# Patient Record
Sex: FEMALE | Race: WHITE | Employment: OTHER | URBAN - METROPOLITAN AREA
[De-identification: names, ages, dates, MRNs, and addresses within clinical notes are randomized per-mention and may not be internally consistent; named-entity substitution may affect disease eponyms.]

---

## 2017-03-02 ENCOUNTER — HOSPITAL ENCOUNTER (OUTPATIENT)
Dept: MAMMOGRAPHY | Age: 62
Discharge: HOME | End: 2017-03-02

## 2017-03-02 DIAGNOSIS — Z12.31 ENCOUNTER FOR SCREENING MAMMOGRAM FOR BREAST CANCER: ICD-10-CM

## 2017-06-22 ENCOUNTER — TELEPHONE (OUTPATIENT)
Dept: ENDOCRINOLOGY | Age: 62
End: 2017-06-22

## 2017-06-29 ENCOUNTER — NURSE ONLY (OUTPATIENT)
Age: 62
End: 2017-06-29

## 2017-06-29 DIAGNOSIS — M81.0 OSTEOPOROSIS, POSTMENOPAUSAL: Primary | ICD-10-CM

## 2017-06-29 RX ORDER — ACETAMINOPHEN 160 MG
TABLET,DISINTEGRATING ORAL
COMMUNITY
End: 2019-12-27

## 2018-01-17 PROBLEM — E55.9 VITAMIN D DEFICIENCY: Status: ACTIVE | Noted: 2018-01-17

## 2018-01-30 ENCOUNTER — PROCEDURE VISIT (OUTPATIENT)
Age: 63
End: 2018-01-30

## 2018-01-30 ENCOUNTER — HOSPITAL ENCOUNTER (OUTPATIENT)
Dept: GENERAL RADIOLOGY | Age: 63
Discharge: HOME | End: 2018-01-30
Attending: INTERNAL MEDICINE | Admitting: INTERNAL MEDICINE

## 2018-01-30 ENCOUNTER — OFFICE VISIT (OUTPATIENT)
Age: 63
End: 2018-01-30

## 2018-01-30 VITALS
BODY MASS INDEX: 23.07 KG/M2 | HEIGHT: 63 IN | DIASTOLIC BLOOD PRESSURE: 78 MMHG | SYSTOLIC BLOOD PRESSURE: 134 MMHG | WEIGHT: 130.2 LBS

## 2018-01-30 DIAGNOSIS — M81.0 OSTEOPOROSIS, POSTMENOPAUSAL: Primary | ICD-10-CM

## 2018-01-30 DIAGNOSIS — M81.0 OSTEOPOROSIS, POSTMENOPAUSAL: ICD-10-CM

## 2018-01-30 DIAGNOSIS — E55.9 VITAMIN D DEFICIENCY: ICD-10-CM

## 2018-01-30 DIAGNOSIS — Z51.81 MEDICATION MONITORING ENCOUNTER: ICD-10-CM

## 2018-01-30 LAB
ALBUMIN SERPL-MCNC: 4.7 G/DL (ref 3.4–5)
ANION GAP SERPL CALCULATED.3IONS-SCNC: 13 MMOL/L (ref 3–16)
BUN BLDV-MCNC: 16 MG/DL (ref 7–20)
CALCIUM SERPL-MCNC: 9.5 MG/DL (ref 8.3–10.6)
CHLORIDE BLD-SCNC: 101 MMOL/L (ref 99–110)
CO2: 30 MMOL/L (ref 21–32)
CREAT SERPL-MCNC: 0.7 MG/DL (ref 0.6–1.2)
GFR AFRICAN AMERICAN: >60
GFR NON-AFRICAN AMERICAN: >60
GLUCOSE BLD-MCNC: 95 MG/DL (ref 70–99)
PHOSPHORUS: 4.5 MG/DL (ref 2.5–4.9)
POTASSIUM SERPL-SCNC: 4.3 MMOL/L (ref 3.5–5.1)
SODIUM BLD-SCNC: 144 MMOL/L (ref 136–145)

## 2018-01-30 PROCEDURE — 1036F TOBACCO NON-USER: CPT | Performed by: INTERNAL MEDICINE

## 2018-01-30 PROCEDURE — 96372 THER/PROPH/DIAG INJ SC/IM: CPT | Performed by: INTERNAL MEDICINE

## 2018-01-30 PROCEDURE — 3014F SCREEN MAMMO DOC REV: CPT | Performed by: INTERNAL MEDICINE

## 2018-01-30 PROCEDURE — 3017F COLORECTAL CA SCREEN DOC REV: CPT | Performed by: INTERNAL MEDICINE

## 2018-01-30 PROCEDURE — 99214 OFFICE O/P EST MOD 30 MIN: CPT | Performed by: INTERNAL MEDICINE

## 2018-01-30 PROCEDURE — G8484 FLU IMMUNIZE NO ADMIN: HCPCS | Performed by: INTERNAL MEDICINE

## 2018-01-30 PROCEDURE — 77080 DXA BONE DENSITY AXIAL: CPT | Performed by: INTERNAL MEDICINE

## 2018-01-30 PROCEDURE — G8420 CALC BMI NORM PARAMETERS: HCPCS | Performed by: INTERNAL MEDICINE

## 2018-01-30 PROCEDURE — G8427 DOCREV CUR MEDS BY ELIG CLIN: HCPCS | Performed by: INTERNAL MEDICINE

## 2018-01-30 NOTE — PROGRESS NOTES
[facility], [place] ([facility]) Osteoporosis and [address] [facility] M[address]a [address], [address] [place] [address]  Phone [phone]  Fax [phone]    NAME: Pamela Rueda   :  1955  CONSULT DATE: 10/08/2012  MOST RECENT VISIT: 2016  TODAYS DATE: 2018          Labs @ [facility] 2016    PROBLEMS. Osteoporosis by DXA 2008, lowest T-score -4.6, Z-score -2.6 in the spine (L3-L4)    Family history of osteoporosis, mother  Vitamin D deficiency, desirable 25-OH D is 30-60 ng/mL    27 ng/mL 2016  Natural menopause age 54 ()    CURRENT MANAGEMENT FOR OSTEOPOROSIS. Calcium, diet 1800 mg/d + OsCal-500 BID    900 mg/d milk, 300 mg/d yogurt, 300 mg/d cheese, 300 mg/d other   Vitamin D, with calcium 400 IU/d + supplement 2000 IU/d added 2016  Exercise, walks one mile daily, exercise bike  Pharmacologic therapy, Prolia 60 mg SQ twice yearly started 2014    PREVIOUS MEDICATIONS FOR OSTEOPOROSIS. Actonel [id number], changed by MD John Paul Case -2012, stopped due to low BMD, again 10/2012-2014, changed to [address] [place] [address],[address]. None  OTC MEDICATIONS. Ibuprofen    CHIEF COMPLAINT. Here for followup of osteoporosis and monitoring treatment. No new related signs or symptoms. INTERVAL HISTORY. See problem list for chronic/inactive conditions. She received Prolia without side effects. No falls, near-falls or fractures. She feels well overall. FOR FULL DETAILS OF FAMILY HISTORY, PAST MEDICAL AND SURGICAL HISTORY, SOCIAL HISTORY, AND REVIEW OF SYSTEMS, SEE PATIENT QUESTIONNAIRE OF TODAYS DATE. PHYSICAL EXAMINATION. GENERAL. Well-nourished, well-developed, normally proportioned adult. APPEARANCE. Healthy. MENTAL STATUS. Cheerful and alert. Oriented to time, place, and person. MUSCULOSKELETAL. Spinal contours are normal.  No spine tenderness to palpation or percussion. Two finger spaces between ribs and pelvis. No joint deformity. No edema.  Gait steady without

## 2018-01-31 LAB — VITAMIN D 25-HYDROXY: 47 NG/ML

## 2018-02-07 ENCOUNTER — TELEPHONE (OUTPATIENT)
Dept: ENDOCRINOLOGY | Age: 63
End: 2018-02-07

## 2018-02-07 NOTE — TELEPHONE ENCOUNTER
Pt stopped in to see if she can get a letter of recommendation for her to be in the [employer] program. She is technically not old enough due to not being on medicare, however, her  was able to get a recommendation with a letter from his doctor, so she thought she would try the same thing. She can get a discount by joining this program, [employer] program. Please give her a call to let her know if it's possible to get a letter.

## 2018-02-08 NOTE — TELEPHONE ENCOUNTER
Patient is requesting a letter of recommendation for her to be able to join [employer] program, since she is not on Medicare.   Please advise

## 2018-03-08 ENCOUNTER — HOSPITAL ENCOUNTER (OUTPATIENT)
Dept: MAMMOGRAPHY | Age: 63
Discharge: HOME | End: 2018-03-08
Attending: OBSTETRICS & GYNECOLOGY | Admitting: OBSTETRICS & GYNECOLOGY

## 2018-03-08 DIAGNOSIS — Z12.39 BREAST CANCER SCREENING: ICD-10-CM

## 2018-07-31 ENCOUNTER — TELEPHONE (OUTPATIENT)
Age: 63
End: 2018-07-31

## 2018-08-01 DIAGNOSIS — M81.0 OSTEOPOROSIS, POSTMENOPAUSAL: Primary | ICD-10-CM

## 2018-08-02 ENCOUNTER — NURSE ONLY (OUTPATIENT)
Age: 63
End: 2018-08-02

## 2018-08-02 DIAGNOSIS — M81.0 OSTEOPOROSIS, POSTMENOPAUSAL: Primary | ICD-10-CM

## 2018-08-02 PROCEDURE — 96372 THER/PROPH/DIAG INJ SC/IM: CPT | Performed by: INTERNAL MEDICINE

## 2018-08-02 PROCEDURE — 99999 PR OFFICE/OUTPT VISIT,PROCEDURE ONLY: CPT | Performed by: INTERNAL MEDICINE

## 2018-08-02 NOTE — PROGRESS NOTES
Informed patient if any signs of redness,rash,swelling or unusual symptoms occur, please contact the office. Prolia given per physician order. Patient supplied the Prolia. It is the patient's responsibility to notify the physician office of new insurance plan prior to appointment to prevent delay in treatment. Please send a copy of the front and back of the insurance card either by fax, mail or stop by the office.

## 2018-12-17 ENCOUNTER — TELEPHONE (OUTPATIENT)
Dept: ENDOCRINOLOGY | Age: 63
End: 2018-12-17

## 2019-01-29 ENCOUNTER — TELEPHONE (OUTPATIENT)
Dept: ENDOCRINOLOGY | Age: 64
End: 2019-01-29

## 2019-02-05 DIAGNOSIS — M81.0 OSTEOPOROSIS, POSTMENOPAUSAL: Primary | ICD-10-CM

## 2019-02-18 ENCOUNTER — TELEPHONE (OUTPATIENT)
Dept: ENDOCRINOLOGY | Age: 64
End: 2019-02-18

## 2019-02-21 ENCOUNTER — TELEPHONE (OUTPATIENT)
Dept: ENDOCRINOLOGY | Age: 64
End: 2019-02-21

## 2019-02-25 ENCOUNTER — OFFICE VISIT (OUTPATIENT)
Dept: ENDOCRINOLOGY | Age: 64
End: 2019-02-25
Payer: COMMERCIAL

## 2019-02-25 ENCOUNTER — PROCEDURE VISIT (OUTPATIENT)
Dept: ENDOCRINOLOGY | Age: 64
End: 2019-02-25
Payer: COMMERCIAL

## 2019-02-25 ENCOUNTER — HOSPITAL ENCOUNTER (OUTPATIENT)
Dept: GENERAL RADIOLOGY | Age: 64
Discharge: HOME | End: 2019-02-25
Payer: COMMERCIAL

## 2019-02-25 VITALS
BODY MASS INDEX: 22.96 KG/M2 | HEIGHT: 63 IN | WEIGHT: 129.6 LBS | SYSTOLIC BLOOD PRESSURE: 131 MMHG | DIASTOLIC BLOOD PRESSURE: 78 MMHG

## 2019-02-25 DIAGNOSIS — M81.0 OSTEOPOROSIS, POSTMENOPAUSAL: ICD-10-CM

## 2019-02-25 DIAGNOSIS — E55.9 VITAMIN D DEFICIENCY: ICD-10-CM

## 2019-02-25 DIAGNOSIS — Z51.81 MEDICATION MONITORING ENCOUNTER: ICD-10-CM

## 2019-02-25 PROCEDURE — 77080 DXA BONE DENSITY AXIAL: CPT

## 2019-02-25 PROCEDURE — 99214 OFFICE O/P EST MOD 30 MIN: CPT | Performed by: INTERNAL MEDICINE

## 2019-02-25 PROCEDURE — 96372 THER/PROPH/DIAG INJ SC/IM: CPT | Performed by: INTERNAL MEDICINE

## 2019-02-25 PROCEDURE — 77080 DXA BONE DENSITY AXIAL: CPT | Performed by: INTERNAL MEDICINE

## 2019-02-25 RX ORDER — FLUOROURACIL 50 MG/G
CREAM TOPICAL 2 TIMES DAILY
COMMUNITY

## 2019-03-11 ENCOUNTER — HOSPITAL ENCOUNTER (OUTPATIENT)
Dept: MAMMOGRAPHY | Age: 64
Discharge: HOME | End: 2019-03-11
Payer: OTHER GOVERNMENT

## 2019-03-11 DIAGNOSIS — Z12.31 ENCOUNTER FOR SCREENING MAMMOGRAM FOR BREAST CANCER: ICD-10-CM

## 2019-03-11 PROCEDURE — 77067 SCR MAMMO BI INCL CAD: CPT

## 2019-08-29 ENCOUNTER — NURSE ONLY (OUTPATIENT)
Dept: ENDOCRINOLOGY | Age: 64
End: 2019-08-29
Payer: COMMERCIAL

## 2019-08-29 DIAGNOSIS — M81.0 OSTEOPOROSIS, POSTMENOPAUSAL: ICD-10-CM

## 2019-08-29 PROCEDURE — 96372 THER/PROPH/DIAG INJ SC/IM: CPT | Performed by: INTERNAL MEDICINE

## 2019-10-04 LAB
HPV COMMENT: NORMAL
HPV TYPE 16: NOT DETECTED
HPV TYPE 18: NOT DETECTED
HPVOH (OTHER TYPES): NOT DETECTED

## 2020-01-02 ENCOUNTER — HOSPITAL ENCOUNTER (OUTPATIENT)
Dept: GENERAL RADIOLOGY | Age: 65
Discharge: HOME | End: 2020-01-02
Payer: OTHER GOVERNMENT

## 2020-01-02 ENCOUNTER — HOSPITAL ENCOUNTER (OUTPATIENT)
Age: 65
Discharge: HOME | End: 2020-01-02
Payer: OTHER GOVERNMENT

## 2020-01-02 LAB
A/G RATIO: 1.4 (ref 1.1–2.2)
ABO/RH: NORMAL
ALBUMIN SERPL-MCNC: 4.3 G/DL (ref 3.4–5)
ALP BLD-CCNC: 66 U/L (ref 40–129)
ALT SERPL-CCNC: 18 U/L (ref 10–40)
ANION GAP SERPL CALCULATED.3IONS-SCNC: 13 MMOL/L (ref 3–16)
ANTIBODY SCREEN: NORMAL
AST SERPL-CCNC: 18 U/L (ref 15–37)
BASOPHILS ABSOLUTE: 0.1 K/UL (ref 0–0.2)
BASOPHILS RELATIVE PERCENT: 1 %
BILIRUB SERPL-MCNC: 0.6 MG/DL (ref 0–1)
BILIRUBIN URINE: NEGATIVE
BLOOD, URINE: NEGATIVE
BUN BLDV-MCNC: 13 MG/DL (ref 7–20)
CALCIUM SERPL-MCNC: 9 MG/DL (ref 8.3–10.6)
CHLORIDE BLD-SCNC: 101 MMOL/L (ref 99–110)
CLARITY: ABNORMAL
CO2: 26 MMOL/L (ref 21–32)
COLOR: YELLOW
CREAT SERPL-MCNC: 0.7 MG/DL (ref 0.6–1.2)
EKG ATRIAL RATE: 79 BPM
EKG DIAGNOSIS: NORMAL
EKG P AXIS: 67 DEGREES
EKG P-R INTERVAL: 128 MS
EKG Q-T INTERVAL: 370 MS
EKG QRS DURATION: 76 MS
EKG QTC CALCULATION (BAZETT): 424 MS
EKG R AXIS: 82 DEGREES
EKG T AXIS: 9 DEGREES
EKG VENTRICULAR RATE: 79 BPM
EOSINOPHILS ABSOLUTE: 0.2 K/UL (ref 0–0.6)
EOSINOPHILS RELATIVE PERCENT: 2.7 %
EPITHELIAL CELLS, UA: 9 /HPF (ref 0–5)
GFR AFRICAN AMERICAN: >60
GFR NON-AFRICAN AMERICAN: >60
GLOBULIN: 3 G/DL
GLUCOSE BLD-MCNC: 89 MG/DL (ref 70–99)
GLUCOSE URINE: NEGATIVE MG/DL
HCT VFR BLD CALC: 43.3 % (ref 36–48)
HEMOGLOBIN: 14.4 G/DL (ref 12–16)
HYALINE CASTS: 4 /LPF (ref 0–8)
KETONES, URINE: NEGATIVE MG/DL
LEUKOCYTE ESTERASE, URINE: ABNORMAL
LYMPHOCYTES ABSOLUTE: 1 K/UL (ref 1–5.1)
LYMPHOCYTES RELATIVE PERCENT: 11.6 %
MCH RBC QN AUTO: 31 PG (ref 26–34)
MCHC RBC AUTO-ENTMCNC: 33.3 G/DL (ref 31–36)
MCV RBC AUTO: 93.2 FL (ref 80–100)
MICROSCOPIC EXAMINATION: YES
MONOCYTES ABSOLUTE: 0.9 K/UL (ref 0–1.3)
MONOCYTES RELATIVE PERCENT: 10.4 %
NEUTROPHILS ABSOLUTE: 6.4 K/UL (ref 1.7–7.7)
NEUTROPHILS RELATIVE PERCENT: 74.3 %
NITRITE, URINE: NEGATIVE
PDW BLD-RTO: 14.5 % (ref 12.4–15.4)
PH UA: 6 (ref 5–8)
PLATELET # BLD: 480 K/UL (ref 135–450)
PMV BLD AUTO: 8 FL (ref 5–10.5)
POTASSIUM SERPL-SCNC: 3.8 MMOL/L (ref 3.5–5.1)
PROTEIN UA: NEGATIVE MG/DL
RBC # BLD: 4.65 M/UL (ref 4–5.2)
RBC UA: 2 /HPF (ref 0–4)
SODIUM BLD-SCNC: 140 MMOL/L (ref 136–145)
SPECIFIC GRAVITY UA: 1.02 (ref 1–1.03)
TOTAL PROTEIN: 7.3 G/DL (ref 6.4–8.2)
URINE TYPE: ABNORMAL
UROBILINOGEN, URINE: 0.2 E.U./DL
WBC # BLD: 8.7 K/UL (ref 4–11)
WBC UA: 2 /HPF (ref 0–5)

## 2020-01-02 PROCEDURE — 80053 COMPREHEN METABOLIC PANEL: CPT

## 2020-01-02 PROCEDURE — 93005 ELECTROCARDIOGRAM TRACING: CPT | Performed by: OBSTETRICS & GYNECOLOGY

## 2020-01-02 PROCEDURE — 71046 X-RAY EXAM CHEST 2 VIEWS: CPT

## 2020-01-02 PROCEDURE — 86900 BLOOD TYPING SEROLOGIC ABO: CPT

## 2020-01-02 PROCEDURE — 81001 URINALYSIS AUTO W/SCOPE: CPT

## 2020-01-02 PROCEDURE — 85025 COMPLETE CBC W/AUTO DIFF WBC: CPT

## 2020-01-02 PROCEDURE — 93010 ELECTROCARDIOGRAM REPORT: CPT | Performed by: INTERNAL MEDICINE

## 2020-01-02 PROCEDURE — 86901 BLOOD TYPING SEROLOGIC RH(D): CPT

## 2020-01-02 PROCEDURE — 86850 RBC ANTIBODY SCREEN: CPT

## 2020-01-02 PROCEDURE — 36415 COLL VENOUS BLD VENIPUNCTURE: CPT

## 2020-01-16 ENCOUNTER — HOSPITAL ENCOUNTER (OUTPATIENT)
Age: 65
Setting detail: OBSERVATION
Discharge: HOME | End: 2020-01-17
Attending: OBSTETRICS & GYNECOLOGY | Admitting: OBSTETRICS & GYNECOLOGY
Payer: OTHER GOVERNMENT

## 2020-01-16 ENCOUNTER — ANESTHESIA EVENT (OUTPATIENT)
Dept: OPERATING ROOM | Age: 65
End: 2020-01-16
Payer: OTHER GOVERNMENT

## 2020-01-16 ENCOUNTER — ANESTHESIA (OUTPATIENT)
Dept: OPERATING ROOM | Age: 65
End: 2020-01-16
Payer: OTHER GOVERNMENT

## 2020-01-16 VITALS
OXYGEN SATURATION: 100 % | SYSTOLIC BLOOD PRESSURE: 91 MMHG | TEMPERATURE: 96.1 F | DIASTOLIC BLOOD PRESSURE: 57 MMHG | RESPIRATION RATE: 13 BRPM

## 2020-01-16 PROBLEM — N81.3 UTEROVAGINAL PROLAPSE, COMPLETE: Status: ACTIVE | Noted: 2020-01-16

## 2020-01-16 LAB
ABO/RH: NORMAL
ANTIBODY SCREEN: NORMAL

## 2020-01-16 PROCEDURE — 2720000010 HC SURG SUPPLY STERILE: Performed by: OBSTETRICS & GYNECOLOGY

## 2020-01-16 PROCEDURE — 6360000002 HC RX W HCPCS: Performed by: OBSTETRICS & GYNECOLOGY

## 2020-01-16 PROCEDURE — 86850 RBC ANTIBODY SCREEN: CPT

## 2020-01-16 PROCEDURE — 6360000002 HC RX W HCPCS: Performed by: NURSE ANESTHETIST, CERTIFIED REGISTERED

## 2020-01-16 PROCEDURE — 36415 COLL VENOUS BLD VENIPUNCTURE: CPT

## 2020-01-16 PROCEDURE — 94150 VITAL CAPACITY TEST: CPT

## 2020-01-16 PROCEDURE — 88305 TISSUE EXAM BY PATHOLOGIST: CPT

## 2020-01-16 PROCEDURE — 2580000003 HC RX 258: Performed by: OBSTETRICS & GYNECOLOGY

## 2020-01-16 PROCEDURE — S2900 ROBOTIC SURGICAL SYSTEM: HCPCS | Performed by: OBSTETRICS & GYNECOLOGY

## 2020-01-16 PROCEDURE — 3700000001 HC ADD 15 MINUTES (ANESTHESIA): Performed by: OBSTETRICS & GYNECOLOGY

## 2020-01-16 PROCEDURE — 2500000003 HC RX 250 WO HCPCS: Performed by: NURSE ANESTHETIST, CERTIFIED REGISTERED

## 2020-01-16 PROCEDURE — 86900 BLOOD TYPING SEROLOGIC ABO: CPT

## 2020-01-16 PROCEDURE — 3600000019 HC SURGERY ROBOT ADDTL 15MIN: Performed by: OBSTETRICS & GYNECOLOGY

## 2020-01-16 PROCEDURE — 6370000000 HC RX 637 (ALT 250 FOR IP): Performed by: FAMILY MEDICINE

## 2020-01-16 PROCEDURE — 2500000003 HC RX 250 WO HCPCS: Performed by: OBSTETRICS & GYNECOLOGY

## 2020-01-16 PROCEDURE — G0378 HOSPITAL OBSERVATION PER HR: HCPCS

## 2020-01-16 PROCEDURE — 6370000000 HC RX 637 (ALT 250 FOR IP): Performed by: OBSTETRICS & GYNECOLOGY

## 2020-01-16 PROCEDURE — 3700000000 HC ANESTHESIA ATTENDED CARE: Performed by: OBSTETRICS & GYNECOLOGY

## 2020-01-16 PROCEDURE — C1781 MESH (IMPLANTABLE): HCPCS | Performed by: OBSTETRICS & GYNECOLOGY

## 2020-01-16 PROCEDURE — C1771 REP DEV, URINARY, W/SLING: HCPCS | Performed by: OBSTETRICS & GYNECOLOGY

## 2020-01-16 PROCEDURE — 2580000003 HC RX 258: Performed by: NURSE ANESTHETIST, CERTIFIED REGISTERED

## 2020-01-16 PROCEDURE — 2709999900 HC NON-CHARGEABLE SUPPLY: Performed by: OBSTETRICS & GYNECOLOGY

## 2020-01-16 PROCEDURE — 6360000002 HC RX W HCPCS: Performed by: FAMILY MEDICINE

## 2020-01-16 PROCEDURE — 86901 BLOOD TYPING SEROLOGIC RH(D): CPT

## 2020-01-16 PROCEDURE — 7100000001 HC PACU RECOVERY - ADDTL 15 MIN: Performed by: OBSTETRICS & GYNECOLOGY

## 2020-01-16 PROCEDURE — 3600000009 HC SURGERY ROBOT BASE: Performed by: OBSTETRICS & GYNECOLOGY

## 2020-01-16 PROCEDURE — 7100000000 HC PACU RECOVERY - FIRST 15 MIN: Performed by: OBSTETRICS & GYNECOLOGY

## 2020-01-16 PROCEDURE — 94761 N-INVAS EAR/PLS OXIMETRY MLT: CPT

## 2020-01-16 DEVICE — TRANSOBTURATOR MID-URETHRAL SYSTEM
Type: IMPLANTABLE DEVICE | Site: BLADDER | Status: FUNCTIONAL
Brand: OBTRYX™ SYSTEM - HALO

## 2020-01-16 DEVICE — MESH SYN Y SHP FLAT SUT SURF MULTDIR POS FOR VAG COLPASSIST: Type: IMPLANTABLE DEVICE | Site: BLADDER | Status: FUNCTIONAL

## 2020-01-16 RX ORDER — SODIUM CHLORIDE 9 MG/ML
INJECTION, SOLUTION INTRAVENOUS CONTINUOUS PRN
Status: DISCONTINUED | OUTPATIENT
Start: 2020-01-16 | End: 2020-01-16 | Stop reason: SDUPTHER

## 2020-01-16 RX ORDER — HYDROCODONE BITARTRATE AND ACETAMINOPHEN 5; 325 MG/1; MG/1
2 TABLET ORAL EVERY 4 HOURS PRN
Status: DISCONTINUED | OUTPATIENT
Start: 2020-01-16 | End: 2020-01-17 | Stop reason: HOSPADM

## 2020-01-16 RX ORDER — APREPITANT 40 MG/1
40 CAPSULE ORAL ONCE
Status: COMPLETED | OUTPATIENT
Start: 2020-01-16 | End: 2020-01-16

## 2020-01-16 RX ORDER — ONDANSETRON 2 MG/ML
4 INJECTION INTRAMUSCULAR; INTRAVENOUS EVERY 8 HOURS PRN
Status: DISCONTINUED | OUTPATIENT
Start: 2020-01-16 | End: 2020-01-17 | Stop reason: HOSPADM

## 2020-01-16 RX ORDER — SODIUM CHLORIDE, SODIUM LACTATE, POTASSIUM CHLORIDE, CALCIUM CHLORIDE 600; 310; 30; 20 MG/100ML; MG/100ML; MG/100ML; MG/100ML
INJECTION, SOLUTION INTRAVENOUS CONTINUOUS PRN
Status: COMPLETED | OUTPATIENT
Start: 2020-01-16 | End: 2020-01-16

## 2020-01-16 RX ORDER — POLYETHYLENE GLYCOL 3350 17 G/17G
17 POWDER, FOR SOLUTION ORAL 2 TIMES DAILY
Status: DISCONTINUED | OUTPATIENT
Start: 2020-01-16 | End: 2020-01-17 | Stop reason: HOSPADM

## 2020-01-16 RX ORDER — LIDOCAINE HYDROCHLORIDE 20 MG/ML
INJECTION, SOLUTION EPIDURAL; INFILTRATION; INTRACAUDAL; PERINEURAL PRN
Status: DISCONTINUED | OUTPATIENT
Start: 2020-01-16 | End: 2020-01-16 | Stop reason: SDUPTHER

## 2020-01-16 RX ORDER — SODIUM CHLORIDE, SODIUM LACTATE, POTASSIUM CHLORIDE, CALCIUM CHLORIDE 600; 310; 30; 20 MG/100ML; MG/100ML; MG/100ML; MG/100ML
INJECTION, SOLUTION INTRAVENOUS CONTINUOUS
Status: DISCONTINUED | OUTPATIENT
Start: 2020-01-16 | End: 2020-01-17 | Stop reason: HOSPADM

## 2020-01-16 RX ORDER — PROMETHAZINE HYDROCHLORIDE 25 MG/ML
6.25 INJECTION, SOLUTION INTRAMUSCULAR; INTRAVENOUS PRN
Status: DISCONTINUED | OUTPATIENT
Start: 2020-01-16 | End: 2020-01-16 | Stop reason: HOSPADM

## 2020-01-16 RX ORDER — BUPIVACAINE HYDROCHLORIDE AND EPINEPHRINE 5; 5 MG/ML; UG/ML
INJECTION, SOLUTION EPIDURAL; INTRACAUDAL; PERINEURAL
Status: COMPLETED | OUTPATIENT
Start: 2020-01-16 | End: 2020-01-16

## 2020-01-16 RX ORDER — HYDROMORPHONE HCL 110MG/55ML
0.25 PATIENT CONTROLLED ANALGESIA SYRINGE INTRAVENOUS EVERY 5 MIN PRN
Status: DISCONTINUED | OUTPATIENT
Start: 2020-01-16 | End: 2020-01-16 | Stop reason: HOSPADM

## 2020-01-16 RX ORDER — MORPHINE SULFATE 4 MG/ML
4 INJECTION, SOLUTION INTRAMUSCULAR; INTRAVENOUS
Status: DISCONTINUED | OUTPATIENT
Start: 2020-01-16 | End: 2020-01-17 | Stop reason: HOSPADM

## 2020-01-16 RX ORDER — LABETALOL HYDROCHLORIDE 5 MG/ML
5 INJECTION, SOLUTION INTRAVENOUS EVERY 10 MIN PRN
Status: DISCONTINUED | OUTPATIENT
Start: 2020-01-16 | End: 2020-01-16 | Stop reason: HOSPADM

## 2020-01-16 RX ORDER — SODIUM CHLORIDE 0.9 % (FLUSH) 0.9 %
10 SYRINGE (ML) INJECTION PRN
Status: DISCONTINUED | OUTPATIENT
Start: 2020-01-16 | End: 2020-01-17 | Stop reason: HOSPADM

## 2020-01-16 RX ORDER — HYDROMORPHONE HCL 110MG/55ML
0.5 PATIENT CONTROLLED ANALGESIA SYRINGE INTRAVENOUS EVERY 5 MIN PRN
Status: DISCONTINUED | OUTPATIENT
Start: 2020-01-16 | End: 2020-01-16 | Stop reason: HOSPADM

## 2020-01-16 RX ORDER — ROCURONIUM BROMIDE 10 MG/ML
INJECTION, SOLUTION INTRAVENOUS PRN
Status: DISCONTINUED | OUTPATIENT
Start: 2020-01-16 | End: 2020-01-16 | Stop reason: SDUPTHER

## 2020-01-16 RX ORDER — GLYCOPYRROLATE 1 MG/5 ML
SYRINGE (ML) INTRAVENOUS PRN
Status: DISCONTINUED | OUTPATIENT
Start: 2020-01-16 | End: 2020-01-16 | Stop reason: SDUPTHER

## 2020-01-16 RX ORDER — ACETAMINOPHEN 325 MG/1
650 TABLET ORAL EVERY 4 HOURS PRN
Status: DISCONTINUED | OUTPATIENT
Start: 2020-01-16 | End: 2020-01-17 | Stop reason: HOSPADM

## 2020-01-16 RX ORDER — KETOROLAC TROMETHAMINE 15 MG/ML
15 INJECTION, SOLUTION INTRAMUSCULAR; INTRAVENOUS EVERY 6 HOURS
Status: DISCONTINUED | OUTPATIENT
Start: 2020-01-16 | End: 2020-01-17 | Stop reason: HOSPADM

## 2020-01-16 RX ORDER — PHENAZOPYRIDINE HYDROCHLORIDE 100 MG/1
200 TABLET, FILM COATED ORAL ONCE
Status: COMPLETED | OUTPATIENT
Start: 2020-01-16 | End: 2020-01-16

## 2020-01-16 RX ORDER — ONDANSETRON 2 MG/ML
INJECTION INTRAMUSCULAR; INTRAVENOUS PRN
Status: DISCONTINUED | OUTPATIENT
Start: 2020-01-16 | End: 2020-01-16 | Stop reason: SDUPTHER

## 2020-01-16 RX ORDER — DEXAMETHASONE SODIUM PHOSPHATE 4 MG/ML
INJECTION, SOLUTION INTRA-ARTICULAR; INTRALESIONAL; INTRAMUSCULAR; INTRAVENOUS; SOFT TISSUE PRN
Status: DISCONTINUED | OUTPATIENT
Start: 2020-01-16 | End: 2020-01-16 | Stop reason: SDUPTHER

## 2020-01-16 RX ORDER — NEOSTIGMINE METHYLSULFATE 5 MG/5 ML
SYRINGE (ML) INTRAVENOUS PRN
Status: DISCONTINUED | OUTPATIENT
Start: 2020-01-16 | End: 2020-01-16 | Stop reason: SDUPTHER

## 2020-01-16 RX ORDER — SODIUM CHLORIDE 0.9 % (FLUSH) 0.9 %
10 SYRINGE (ML) INJECTION EVERY 12 HOURS SCHEDULED
Status: DISCONTINUED | OUTPATIENT
Start: 2020-01-16 | End: 2020-01-17 | Stop reason: HOSPADM

## 2020-01-16 RX ORDER — MAGNESIUM HYDROXIDE 1200 MG/15ML
LIQUID ORAL
Status: COMPLETED | OUTPATIENT
Start: 2020-01-16 | End: 2020-01-16

## 2020-01-16 RX ORDER — PROPOFOL 10 MG/ML
INJECTION, EMULSION INTRAVENOUS PRN
Status: DISCONTINUED | OUTPATIENT
Start: 2020-01-16 | End: 2020-01-16 | Stop reason: SDUPTHER

## 2020-01-16 RX ORDER — MEPERIDINE HYDROCHLORIDE 25 MG/ML
12.5 INJECTION INTRAMUSCULAR; INTRAVENOUS; SUBCUTANEOUS EVERY 5 MIN PRN
Status: DISCONTINUED | OUTPATIENT
Start: 2020-01-16 | End: 2020-01-16 | Stop reason: HOSPADM

## 2020-01-16 RX ORDER — FENTANYL CITRATE 50 UG/ML
INJECTION, SOLUTION INTRAMUSCULAR; INTRAVENOUS PRN
Status: DISCONTINUED | OUTPATIENT
Start: 2020-01-16 | End: 2020-01-16 | Stop reason: SDUPTHER

## 2020-01-16 RX ORDER — MORPHINE SULFATE 2 MG/ML
2 INJECTION, SOLUTION INTRAMUSCULAR; INTRAVENOUS
Status: DISCONTINUED | OUTPATIENT
Start: 2020-01-16 | End: 2020-01-17 | Stop reason: HOSPADM

## 2020-01-16 RX ORDER — LIDOCAINE HYDROCHLORIDE 10 MG/ML
0.5 INJECTION, SOLUTION EPIDURAL; INFILTRATION; INTRACAUDAL; PERINEURAL ONCE
Status: DISCONTINUED | OUTPATIENT
Start: 2020-01-16 | End: 2020-01-16 | Stop reason: HOSPADM

## 2020-01-16 RX ORDER — MAGNESIUM HYDROXIDE 1200 MG/15ML
LIQUID ORAL CONTINUOUS PRN
Status: COMPLETED | OUTPATIENT
Start: 2020-01-16 | End: 2020-01-16

## 2020-01-16 RX ORDER — OXYCODONE HYDROCHLORIDE 5 MG/1
5 TABLET ORAL PRN
Status: DISCONTINUED | OUTPATIENT
Start: 2020-01-16 | End: 2020-01-16 | Stop reason: HOSPADM

## 2020-01-16 RX ORDER — SCOLOPAMINE TRANSDERMAL SYSTEM 1 MG/1
1 PATCH, EXTENDED RELEASE TRANSDERMAL ONCE
Status: DISCONTINUED | OUTPATIENT
Start: 2020-01-16 | End: 2020-01-17 | Stop reason: HOSPADM

## 2020-01-16 RX ORDER — HYDROCODONE BITARTRATE AND ACETAMINOPHEN 5; 325 MG/1; MG/1
1 TABLET ORAL EVERY 4 HOURS PRN
Status: DISCONTINUED | OUTPATIENT
Start: 2020-01-16 | End: 2020-01-17 | Stop reason: HOSPADM

## 2020-01-16 RX ORDER — OXYCODONE HYDROCHLORIDE 5 MG/1
10 TABLET ORAL PRN
Status: DISCONTINUED | OUTPATIENT
Start: 2020-01-16 | End: 2020-01-16 | Stop reason: HOSPADM

## 2020-01-16 RX ORDER — LIDOCAINE HYDROCHLORIDE 20 MG/ML
INJECTION, SOLUTION EPIDURAL; INFILTRATION; INTRACAUDAL; PERINEURAL PRN
Status: DISCONTINUED | OUTPATIENT
Start: 2020-01-16 | End: 2020-01-16

## 2020-01-16 RX ORDER — DIPHENHYDRAMINE HYDROCHLORIDE 50 MG/ML
12.5 INJECTION INTRAMUSCULAR; INTRAVENOUS
Status: DISCONTINUED | OUTPATIENT
Start: 2020-01-16 | End: 2020-01-16 | Stop reason: HOSPADM

## 2020-01-16 RX ORDER — FENTANYL CITRATE 50 UG/ML
50 INJECTION, SOLUTION INTRAMUSCULAR; INTRAVENOUS EVERY 5 MIN PRN
Status: DISCONTINUED | OUTPATIENT
Start: 2020-01-16 | End: 2020-01-16 | Stop reason: HOSPADM

## 2020-01-16 RX ORDER — SODIUM CHLORIDE, SODIUM LACTATE, POTASSIUM CHLORIDE, CALCIUM CHLORIDE 600; 310; 30; 20 MG/100ML; MG/100ML; MG/100ML; MG/100ML
INJECTION, SOLUTION INTRAVENOUS CONTINUOUS
Status: DISCONTINUED | OUTPATIENT
Start: 2020-01-16 | End: 2020-01-16

## 2020-01-16 RX ORDER — EPHEDRINE SULFATE/0.9% NACL/PF 50 MG/5 ML
SYRINGE (ML) INTRAVENOUS PRN
Status: DISCONTINUED | OUTPATIENT
Start: 2020-01-16 | End: 2020-01-16 | Stop reason: SDUPTHER

## 2020-01-16 RX ADMIN — APREPITANT 40 MG: 40 CAPSULE ORAL at 10:08

## 2020-01-16 RX ADMIN — Medication 5 MG: at 14:08

## 2020-01-16 RX ADMIN — FENTANYL CITRATE 50 MCG: 50 INJECTION, SOLUTION INTRAMUSCULAR; INTRAVENOUS at 13:52

## 2020-01-16 RX ADMIN — POLYETHYLENE GLYCOL 3350 17 G: 17 POWDER, FOR SOLUTION ORAL at 21:33

## 2020-01-16 RX ADMIN — ONDANSETRON 4 MG: 2 INJECTION INTRAMUSCULAR; INTRAVENOUS at 14:29

## 2020-01-16 RX ADMIN — Medication 0.4 MG: at 14:32

## 2020-01-16 RX ADMIN — PROPOFOL 150 MG: 10 INJECTION, EMULSION INTRAVENOUS at 11:16

## 2020-01-16 RX ADMIN — PHENAZOPYRIDINE HYDROCHLORIDE 200 MG: 100 TABLET ORAL at 10:08

## 2020-01-16 RX ADMIN — GENTAMICIN SULFATE 88.4 MG: 40 INJECTION, SOLUTION INTRAMUSCULAR; INTRAVENOUS at 11:02

## 2020-01-16 RX ADMIN — DEXAMETHASONE SODIUM PHOSPHATE 4 MG: 4 INJECTION, SOLUTION INTRAMUSCULAR; INTRAVENOUS at 11:20

## 2020-01-16 RX ADMIN — SODIUM CHLORIDE, POTASSIUM CHLORIDE, SODIUM LACTATE AND CALCIUM CHLORIDE: 600; 310; 30; 20 INJECTION, SOLUTION INTRAVENOUS at 17:35

## 2020-01-16 RX ADMIN — ROCURONIUM BROMIDE 50 MG: 10 INJECTION, SOLUTION INTRAVENOUS at 11:16

## 2020-01-16 RX ADMIN — Medication 10 MG: at 12:22

## 2020-01-16 RX ADMIN — Medication 5 MG: at 13:57

## 2020-01-16 RX ADMIN — LIDOCAINE HYDROCHLORIDE 100 MG: 20 INJECTION, SOLUTION EPIDURAL; INFILTRATION; INTRACAUDAL; PERINEURAL at 11:16

## 2020-01-16 RX ADMIN — Medication 5 MG: at 14:22

## 2020-01-16 RX ADMIN — KETOROLAC TROMETHAMINE 15 MG: 15 INJECTION, SOLUTION INTRAMUSCULAR; INTRAVENOUS at 17:34

## 2020-01-16 RX ADMIN — KETOROLAC TROMETHAMINE 15 MG: 15 INJECTION, SOLUTION INTRAMUSCULAR; INTRAVENOUS at 21:33

## 2020-01-16 RX ADMIN — FENTANYL CITRATE 50 MCG: 50 INJECTION, SOLUTION INTRAMUSCULAR; INTRAVENOUS at 14:15

## 2020-01-16 RX ADMIN — SODIUM CHLORIDE: 9 INJECTION, SOLUTION INTRAVENOUS at 11:08

## 2020-01-16 RX ADMIN — Medication 3 MG: at 14:32

## 2020-01-16 RX ADMIN — Medication 5 MG: at 13:45

## 2020-01-16 RX ADMIN — FENTANYL CITRATE 100 MCG: 50 INJECTION, SOLUTION INTRAMUSCULAR; INTRAVENOUS at 11:10

## 2020-01-16 ASSESSMENT — PULMONARY FUNCTION TESTS
PIF_VALUE: 13
PIF_VALUE: 14
PIF_VALUE: 25
PIF_VALUE: 24
PIF_VALUE: 25
PIF_VALUE: 25
PIF_VALUE: 23
PIF_VALUE: 16
PIF_VALUE: 23
PIF_VALUE: 15
PIF_VALUE: 15
PIF_VALUE: 23
PIF_VALUE: 24
PIF_VALUE: 14
PIF_VALUE: 25
PIF_VALUE: 14
PIF_VALUE: 13
PIF_VALUE: 23
PIF_VALUE: 24
PIF_VALUE: 25
PIF_VALUE: 25
PIF_VALUE: 14
PIF_VALUE: 23
PIF_VALUE: 25
PIF_VALUE: 24
PIF_VALUE: 25
PIF_VALUE: 8
PIF_VALUE: 23
PIF_VALUE: 25
PIF_VALUE: 26
PIF_VALUE: 14
PIF_VALUE: 17
PIF_VALUE: 13
PIF_VALUE: 23
PIF_VALUE: 3
PIF_VALUE: 13
PIF_VALUE: 13
PIF_VALUE: 5
PIF_VALUE: 25
PIF_VALUE: 14
PIF_VALUE: 24
PIF_VALUE: 24
PIF_VALUE: 25
PIF_VALUE: 14
PIF_VALUE: 25
PIF_VALUE: 14
PIF_VALUE: 13
PIF_VALUE: 13
PIF_VALUE: 24
PIF_VALUE: 25
PIF_VALUE: 23
PIF_VALUE: 13
PIF_VALUE: 23
PIF_VALUE: 25
PIF_VALUE: 14
PIF_VALUE: 13
PIF_VALUE: 14
PIF_VALUE: 13
PIF_VALUE: 24
PIF_VALUE: 24
PIF_VALUE: 0
PIF_VALUE: 21
PIF_VALUE: 13
PIF_VALUE: 0
PIF_VALUE: 23
PIF_VALUE: 3
PIF_VALUE: 16
PIF_VALUE: 21
PIF_VALUE: 1
PIF_VALUE: 15
PIF_VALUE: 25
PIF_VALUE: 24
PIF_VALUE: 23
PIF_VALUE: 14
PIF_VALUE: 25
PIF_VALUE: 25
PIF_VALUE: 14
PIF_VALUE: 24
PIF_VALUE: 24
PIF_VALUE: 14
PIF_VALUE: 25
PIF_VALUE: 23
PIF_VALUE: 14
PIF_VALUE: 25
PIF_VALUE: 13
PIF_VALUE: 25
PIF_VALUE: 14
PIF_VALUE: 20
PIF_VALUE: 13
PIF_VALUE: 20
PIF_VALUE: 16
PIF_VALUE: 26
PIF_VALUE: 25
PIF_VALUE: 1
PIF_VALUE: 13
PIF_VALUE: 25
PIF_VALUE: 23
PIF_VALUE: 21
PIF_VALUE: 14
PIF_VALUE: 23
PIF_VALUE: 14
PIF_VALUE: 25
PIF_VALUE: 23
PIF_VALUE: 22
PIF_VALUE: 14
PIF_VALUE: 23
PIF_VALUE: 14
PIF_VALUE: 24
PIF_VALUE: 14
PIF_VALUE: 13
PIF_VALUE: 24
PIF_VALUE: 23
PIF_VALUE: 24
PIF_VALUE: 25
PIF_VALUE: 14
PIF_VALUE: 2
PIF_VALUE: 24
PIF_VALUE: 23
PIF_VALUE: 23
PIF_VALUE: 5
PIF_VALUE: 13
PIF_VALUE: 23
PIF_VALUE: 23
PIF_VALUE: 24
PIF_VALUE: 24
PIF_VALUE: 14
PIF_VALUE: 13
PIF_VALUE: 14
PIF_VALUE: 24
PIF_VALUE: 13
PIF_VALUE: 23
PIF_VALUE: 23
PIF_VALUE: 17
PIF_VALUE: 23
PIF_VALUE: 25
PIF_VALUE: 24
PIF_VALUE: 14
PIF_VALUE: 23
PIF_VALUE: 22
PIF_VALUE: 23
PIF_VALUE: 14
PIF_VALUE: 14
PIF_VALUE: 24
PIF_VALUE: 24
PIF_VALUE: 22
PIF_VALUE: 14
PIF_VALUE: 25
PIF_VALUE: 25
PIF_VALUE: 15
PIF_VALUE: 23
PIF_VALUE: 3
PIF_VALUE: 14
PIF_VALUE: 13
PIF_VALUE: 24
PIF_VALUE: 13
PIF_VALUE: 13
PIF_VALUE: 14
PIF_VALUE: 20
PIF_VALUE: 14
PIF_VALUE: 14
PIF_VALUE: 24
PIF_VALUE: 13
PIF_VALUE: 4
PIF_VALUE: 14
PIF_VALUE: 13
PIF_VALUE: 25
PIF_VALUE: 24
PIF_VALUE: 25
PIF_VALUE: 24
PIF_VALUE: 23
PIF_VALUE: 24
PIF_VALUE: 22
PIF_VALUE: 14
PIF_VALUE: 25
PIF_VALUE: 25
PIF_VALUE: 22
PIF_VALUE: 23
PIF_VALUE: 14
PIF_VALUE: 24
PIF_VALUE: 13
PIF_VALUE: 23
PIF_VALUE: 24
PIF_VALUE: 25
PIF_VALUE: 25
PIF_VALUE: 13
PIF_VALUE: 23
PIF_VALUE: 25
PIF_VALUE: 24
PIF_VALUE: 13
PIF_VALUE: 15
PIF_VALUE: 23
PIF_VALUE: 25
PIF_VALUE: 14
PIF_VALUE: 24
PIF_VALUE: 14
PIF_VALUE: 24
PIF_VALUE: 14
PIF_VALUE: 23
PIF_VALUE: 23
PIF_VALUE: 25
PIF_VALUE: 14
PIF_VALUE: 13
PIF_VALUE: 54
PIF_VALUE: 13
PIF_VALUE: 8
PIF_VALUE: 14

## 2020-01-16 ASSESSMENT — PAIN DESCRIPTION - LOCATION: LOCATION: ABDOMEN

## 2020-01-16 ASSESSMENT — PAIN - FUNCTIONAL ASSESSMENT: PAIN_FUNCTIONAL_ASSESSMENT: 0-10

## 2020-01-16 ASSESSMENT — PAIN DESCRIPTION - ORIENTATION: ORIENTATION: MID

## 2020-01-16 ASSESSMENT — PAIN SCALES - GENERAL
PAINLEVEL_OUTOF10: 3

## 2020-01-16 ASSESSMENT — PAIN DESCRIPTION - PAIN TYPE: TYPE: SURGICAL PAIN

## 2020-01-16 ASSESSMENT — ENCOUNTER SYMPTOMS: SHORTNESS OF BREATH: 0

## 2020-01-16 NOTE — OP NOTE
Accessory trochars for the second and third robotic arms as well as the assistant port were then placed under direct visualization. Hysterectomy was carried out by opening the pararectal space on either side and tracing the ureters to the uterine vessels. The uterine vessels were cauterized medial to the crossing of the ureters. The   gonadal vessels on either side were cauterized and transected proximal to the ovary. The bladder flap was developed and the bladder taken down to the thao bulb without difficulty. The rectovaginal space was developed as far as possible. .  Attention was then turned to the presacral space and the peritoneum was incised at the promontory. The peritoneal incision was extended in a caudad fashion and sacral promontory   dissected clear. The peritoneal incision was extended from the sacral promontory to the vaginal incision.       The parametria and cardinal ligaments were cut at the level of the inferior aspect of the cervix. The vagina was circumscribed a the level of the Koh cup and the specimen removed via the vagina. Vaginal cuff closed with V-lock and vicryl.        A preformed \"Y\" mesh trimmed and placed into the abdomen. 7 sutures of 0 ethibond were used to achor the mesh anteriorly to the vagina and 7 posteriorly. Care was taken not to have the mesh in direct contact with the cuff incision.        The mesh was then placed using the 3rd robotic arm against the sacrum to  for tension   which was then adjusted with the surgeon examining the vagina. Once the   optimum positioning of the mesh over the sacrum was undertaken, 2 sutures of   0 Gortex  were passed through the mesh, through the sacral promontory and   back through the mesh. These were tied with slip knots approximating the mesh to   the sacrum. Redundant mesh was trimmed and removed. Evicele was sprayed on the dissection beds and the   peritoneum was then closed with 2-0 V-lock suture covering the mesh. The   abdomen was inspected. Hemostasis was excellent.       The robot was undocked and moved away from the table. All skin incisions closed with Vicryl subcuticular and Dermabond.        Excellent vaginal support was noted anteriorly apically     A TOT was carried out by incising the suburethra  with a knife and vagina dissected free to level of perineal diaphram.  Exit incisions created after appropriate measurement. [employer] TOT sling used, direct application technique. Left sling arm applied by directing the trochar through the exit incision onto the internal finger at the intersection of the perineal diaphram and the UVJ. Once contacted the trochar was medially rotated and the sling attatched,. The trochar was laterally rotated bringing the sling through the thigh incision. Identical technmique for right side was used. Patient placed level and tension adjusted using surgeons index finger to prevent overtightening. Sheeting was removed and sling cut at thigh incisions. Vagina closed with vicryl suture. Lodoform packing placed.      Patient was cystoscoped with a camera and 70 degree lens. Both ureters patent as demonstrated by free   flow of urine. . No iatrogenic bladder injury noted. Evidence of   obstruction in the form of trabeculation was noticed. The bladder was   drained, Crawley replaced. Posterior repair carried out by incising the posterior vaginal wall at the dependant most aspect of the prolapse remaining. Sharp dissection was used to reflect the vaginal mucosa off the rectovaginal fascia up to the colpopexy mesh. The rectovaginal fascia was attached to the mesh with 0 PDS and over sewn the length of the vagina. Redundant vagina trimmed and th closed with vicryl  The perineum was reconstructed d with 0 vicryl. Rectal exam showed no narrowing or damage.   Iodoform vaginal packing placed.        Patient  revived from anesthesia, taken to the recovery room in stable condition

## 2020-01-16 NOTE — ANESTHESIA PRE PROCEDURE
Department of Anesthesiology  Preprocedure Note       Name:  Neida Mode   Age:  60 y.o.  :  1955                                          MRN:  [id number]         Date:  2020      Surgeon: Anuja Bunch):  MD John Paul Collins MD    Procedure: ROBOT TOTAL HYSTERECTOMY WITH BILATERAL SALPINGO-OOPHORECTOMY (N/A Abdomen)  ROBOT SACRALCOLPOPEXY (N/A Abdomen)  VAGINAL REPAIR POSTERIOR, SLING PLACEMENT, AND CYSTOSCOPY (N/A Vagina )    Medications prior to admission:   Prior to Admission medications    Medication Sig Start Date End Date Taking? Authorizing Provider   denosumab (PROLIA) 60 MG/ML SOSY SC injection Inject 1 mL into the skin once for 1 dose 19  Anson Garcia MD   fluorouracil (EFUDEX) 5 % cream Apply topically 2 times daily Apply topically 2 times daily. Historical Provider, MD   PROLIA 60 MG/ML SOLN SC injection Inject 1 mL into the skin once for 1 dose One dose every 6 months 19  Anson Garcia MD   ibuprofen (ADVIL;MOTRIN) 200 MG tablet Take 200 mg by mouth every 6 hours as needed.       Historical Provider, MD       Current medications:    Current Facility-Administered Medications   Medication Dose Route Frequency Provider Last Rate Last Dose    gentamicin (GARAMYCIN) 88.4 mg in dextrose 5 % 100 mL IVPB  1.5 mg/kg Intravenous On Call to [address] [place] [address], MD        HYDROmorphone (DILAUDID) injection 0.25 mg  0.25 mg Intravenous Q5 Min PRN Ramón Fay MD        fentaNYL (SUBLIMAZE) injection 50 mcg  50 mcg Intravenous Q5 Min PRN Ramón Fay MD        HYDROmorphone (DILAUDID) injection 0.25 mg  0.25 mg Intravenous Q5 Min PRN Ramón Fay MD        HYDROmorphone (DILAUDID) injection 0.5 mg  0.5 mg Intravenous Q5 Min PRN Ramón Fay MD        oxyCODONE (ROXICODONE) immediate release tablet 5 mg  5 mg Oral PRN Ramón Fay MD        Or    oxyCODONE (ROXICODONE) immediate release tablet 10 mg  10 mg Oral PRN Laz Montalvo Kia Welch MD        diphenhydrAMINE (BENADRYL) injection 12.5 mg  12.5 mg Intravenous Once PRN Ronnie Agosto MD        promethazine [facility]) injection 6.25 mg  6.25 mg Intravenous PRN Ronnie Agosto MD        labetalol (NORMODYNE;TRANDATE) injection 5 mg  5 mg Intravenous Q10 Min PRN Ronnie Agosto MD        meperidine (DEMEROL) injection 12.5 mg  12.5 mg Intravenous Q5 Min PRN Ronnie Agosto MD           Allergies: Allergies   Allergen Reactions    Cefuroxime Axetil Nausea And Vomiting     ceftin       Problem List:    Patient Active Problem List   Diagnosis Code    Osteoporosis, postmenopausal M81.0    Medication monitoring encounter Z51.81    Vitamin D deficiency E55.9       Past Medical History:        Diagnosis Date    Cancer ([facility] [address]     skin basal cell       Past Surgical History:        Procedure Laterality Date    SKIN BIOPSY      right shoulder    SKIN CANCER EXCISION  10/31/2012    WIDE EXCISION BASAL CELL CARCINOMA RIGHT PARANASAL WITH  FROZEN SECTION   REPAIR                                      SKIN CANCER EXCISION  1-30-13    EXCISION BASAL CELL CARCINOMA LEFT ALA, FROZEN SECTION,       Social History:    Social History     Tobacco Use    Smoking status: Never Smoker    Smokeless tobacco: Never Used   Substance Use Topics    Alcohol use: No                                Counseling given: Not Answered      Vital Signs (Current):   Vitals:    12/27/19 1436   Weight: 130 lb (59 kg)   Height: 5' 3\" (1.6 m)                                              BP Readings from Last 3 Encounters:   02/25/19 131/78   01/30/18 134/78   12/20/16 138/76       NPO Status:                                                                                 BMI:   Wt Readings from Last 3 Encounters:   12/27/19 130 lb (59 kg)   02/25/19 129 lb 9.6 oz (58.8 kg)   01/30/18 130 lb 3.2 oz (59.1 kg)     Body mass index is 23.03 kg/m².     CBC:   Lab Results   Component Value Date    WBC 8.7 01/02/2020    RBC 4.65 01/02/2020    HGB 14.4 01/02/2020    HCT 43.3 01/02/2020    MCV 93.2 01/02/2020    RDW 14.5 01/02/2020     01/02/2020       CMP:   Lab Results   Component Value Date     01/02/2020    K 3.8 01/02/2020     01/02/2020    CO2 26 01/02/2020    BUN 13 01/02/2020    CREATININE 0.7 01/02/2020    GFRAA >60 01/02/2020    AGRATIO 1.4 01/02/2020    LABGLOM >60 01/02/2020    LABGLOM 98 10/17/2012    GLUCOSE 89 01/02/2020    PROT 7.3 01/02/2020    PROT 6.8 10/17/2012    CALCIUM 9.0 01/02/2020    BILITOT 0.6 01/02/2020    ALKPHOS 66 01/02/2020    AST 18 01/02/2020    ALT 18 01/02/2020       POC Tests: No results for input(s): POCGLU, POCNA, POCK, POCCL, POCBUN, POCHEMO, POCHCT in the last 72 hours.     Coags: No results found for: PROTIME, INR, APTT    HCG (If Applicable): No results found for: PREGTESTUR, PREGSERUM, HCG, HCGQUANT     ABGs: No results found for: PHART, PO2ART, ZDI3PXY, ZXT7PHV, BEART, G7GGDNZM     Type & Screen (If Applicable):  No results found for: LABABO, [address]    Anesthesia Evaluation  Patient summary reviewed and Nursing notes reviewed no history of anesthetic complications:   Airway: Mallampati: II  TM distance: >3 FB   Neck ROM: full  Mouth opening: > = 3 FB Dental:          Pulmonary:       (-) pneumonia, COPD, asthma, shortness of breath, recent URI and sleep apnea                           Cardiovascular:  Exercise tolerance: good (>4 METS),       (-) pacemaker, hypertension, valvular problems/murmurs, past MI, CAD, CABG/stent, dysrhythmias,  angina,  CHF, orthopnea, PND and  COLON      Rhythm: regular  Rate: normal                    Neuro/Psych:      (-) seizures, neuromuscular disease, TIA, CVA, headaches and psychiatric history           GI/Hepatic/Renal:        (-) hiatal hernia, GERD, PUD, hepatitis, liver disease, no renal disease and bowel prep       Endo/Other:        (-) hypothyroidism, hyperthyroidism, blood dyscrasia, arthritis               Abdominal: Vascular:                                        Anesthesia Plan      general     ASA 2       Induction: intravenous. Anesthetic plan and risks discussed with patient. Plan discussed with CRNA.                   Claudia Rutledge MD   1/16/2020

## 2020-01-16 NOTE — H&P
I have reviewed the history and physical and examined the patient and find no relevant changes. I have reviewed with the patient and/or family the risks, benefits, and alternatives to the procedure.     A: complete uterovaginal prolapse, bladder obstruction, stress urinary incontinence  P: Robotic total hysterectomy bilateral salpingo-oophorectomy, sacrocolpopexy, TOT sling, cysto, posterior repair    Marla Graham MD  1/16/2020

## 2020-01-16 NOTE — ANESTHESIA POSTPROCEDURE EVALUATION
Department of Anesthesiology  Postprocedure Note    Patient: Darek Woo  MRN: [id number]  YOB: 1955  Date of evaluation: 1/16/2020  Time:  3:43 PM     Procedure Summary     Date:  01/16/20 Room / Location:  [facility] OR [address] [place] [address]    Anesthesia Start:  1108 Anesthesia Stop:  1450    Procedures:       ROBOT TOTAL HYSTERECTOMY WITH BILATERAL SALPINGO-OOPHORECTOMY (N/A Abdomen)      ROBOT SACRALCOLPOPEXY (N/A Abdomen)      VAGINAL REPAIR POSTERIOR, SLING PLACEMENT, AND CYSTOSCOPY (N/A Vagina ) Diagnosis:  (COMPLETE UTEROVAGINAL PROLAPSE, FEMALE STRESS INCONTINENCE, BLADDER NECK OBSTRUCTION)    Surgeon:  Sabas Allen MD Responsible Provider:  Herve Lr MD    Anesthesia Type:  general ASA Status:  2          Anesthesia Type: general    Servando Phase I: Servando Score: 9    Servando Phase II:      Last vitals: Reviewed and per EMR flowsheets.        Anesthesia Post Evaluation    Patient location during evaluation: PACU  Patient participation: complete - patient participated  Level of consciousness: awake and alert  Airway patency: patent  Nausea & Vomiting: no nausea and no vomiting  Complications: no  Cardiovascular status: hemodynamically stable  Respiratory status: acceptable  Hydration status: stable

## 2020-01-17 VITALS
DIASTOLIC BLOOD PRESSURE: 65 MMHG | WEIGHT: 128 LBS | RESPIRATION RATE: 16 BRPM | OXYGEN SATURATION: 95 % | BODY MASS INDEX: 22.68 KG/M2 | HEIGHT: 63 IN | SYSTOLIC BLOOD PRESSURE: 101 MMHG | TEMPERATURE: 99.5 F | HEART RATE: 78 BPM

## 2020-01-17 LAB
BASOPHILS ABSOLUTE: 0 K/UL (ref 0–0.2)
BASOPHILS RELATIVE PERCENT: 0.2 %
EOSINOPHILS ABSOLUTE: 0 K/UL (ref 0–0.6)
EOSINOPHILS RELATIVE PERCENT: 0 %
HCT VFR BLD CALC: 35.4 % (ref 36–48)
HEMOGLOBIN: 11.9 G/DL (ref 12–16)
LYMPHOCYTES ABSOLUTE: 1.2 K/UL (ref 1–5.1)
LYMPHOCYTES RELATIVE PERCENT: 7.2 %
MCH RBC QN AUTO: 31.2 PG (ref 26–34)
MCHC RBC AUTO-ENTMCNC: 33.7 G/DL (ref 31–36)
MCV RBC AUTO: 92.7 FL (ref 80–100)
MONOCYTES ABSOLUTE: 1.4 K/UL (ref 0–1.3)
MONOCYTES RELATIVE PERCENT: 8.5 %
NEUTROPHILS ABSOLUTE: 13.8 K/UL (ref 1.7–7.7)
NEUTROPHILS RELATIVE PERCENT: 84.1 %
PDW BLD-RTO: 14.7 % (ref 12.4–15.4)
PLATELET # BLD: 441 K/UL (ref 135–450)
PMV BLD AUTO: 8 FL (ref 5–10.5)
RBC # BLD: 3.82 M/UL (ref 4–5.2)
WBC # BLD: 16.5 K/UL (ref 4–11)

## 2020-01-17 PROCEDURE — 36415 COLL VENOUS BLD VENIPUNCTURE: CPT

## 2020-01-17 PROCEDURE — G0378 HOSPITAL OBSERVATION PER HR: HCPCS

## 2020-01-17 PROCEDURE — 85025 COMPLETE CBC W/AUTO DIFF WBC: CPT

## 2020-01-17 PROCEDURE — 6370000000 HC RX 637 (ALT 250 FOR IP): Performed by: OBSTETRICS & GYNECOLOGY

## 2020-01-17 PROCEDURE — 6360000002 HC RX W HCPCS: Performed by: OBSTETRICS & GYNECOLOGY

## 2020-01-17 RX ORDER — ACETAMINOPHEN 500 MG
1000 TABLET ORAL 3 TIMES DAILY PRN
Qty: 180 TABLET | Refills: 5 | Status: SHIPPED | OUTPATIENT
Start: 2020-01-17

## 2020-01-17 RX ORDER — NITROFURANTOIN 25; 75 MG/1; MG/1
100 CAPSULE ORAL 2 TIMES DAILY
Qty: 10 CAPSULE | Refills: 0 | Status: SHIPPED | OUTPATIENT
Start: 2020-01-17 | End: 2020-01-22

## 2020-01-17 RX ORDER — IBUPROFEN 800 MG/1
800 TABLET ORAL EVERY 8 HOURS PRN
Qty: 90 TABLET | Refills: 1 | Status: SHIPPED | OUTPATIENT
Start: 2020-01-17 | End: 2021-01-16

## 2020-01-17 RX ORDER — POLYETHYLENE GLYCOL 3350 17 G/17G
17 POWDER, FOR SOLUTION ORAL 2 TIMES DAILY
Qty: 527 G | Refills: 3 | Status: SHIPPED | OUTPATIENT
Start: 2020-01-17 | End: 2020-04-16

## 2020-01-17 RX ADMIN — KETOROLAC TROMETHAMINE 15 MG: 15 INJECTION, SOLUTION INTRAMUSCULAR; INTRAVENOUS at 08:31

## 2020-01-17 RX ADMIN — POLYETHYLENE GLYCOL 3350 17 G: 17 POWDER, FOR SOLUTION ORAL at 08:27

## 2020-01-17 ASSESSMENT — PAIN DESCRIPTION - PAIN TYPE: TYPE: SURGICAL PAIN

## 2020-01-17 ASSESSMENT — PAIN DESCRIPTION - LOCATION: LOCATION: ABDOMEN

## 2020-01-17 ASSESSMENT — PAIN DESCRIPTION - ORIENTATION: ORIENTATION: MID

## 2020-01-17 ASSESSMENT — PAIN SCALES - GENERAL: PAINLEVEL_OUTOF10: 1

## 2020-01-17 NOTE — PLAN OF CARE
Problem: Pain:  Goal: Pain level will decrease  Description  Pain level will decrease  1/17/2020 0752 by Juliane Starks RN  Outcome: Ongoing  1/16/2020 1942 by Jim Kim RN  Outcome: Ongoing  Goal: Control of acute pain  Description  Control of acute pain  1/17/2020 0752 by Juliane Starks RN  Outcome: Ongoing  1/16/2020 1942 by Jim Kim RN  Outcome: Ongoing  Goal: Control of chronic pain  Description  Control of chronic pain  1/17/2020 0752 by Juliane Starks RN  Outcome: Ongoing  1/16/2020 1942 by Jim Kim RN  Outcome: Ongoing

## 2020-01-17 NOTE — PLAN OF CARE
Problem: Pain:  Goal: Pain level will decrease  Description  Pain level will decrease  Outcome: Ongoing  Goal: Control of acute pain  Description  Control of acute pain  Outcome: Ongoing  Goal: Control of chronic pain  Description  Control of chronic pain  Outcome: Ongoing   Patient remains free from falls or accidental injury. Room free from clutter. All fall precautions in place. Bed in lowest position with wheels locked and alarm on, call light and belongings within reach, and door open. Pain assessed using 0-10 scale, patient able to voice pain level and states pain improved with prn medication administered.

## 2020-01-17 NOTE — PROGRESS NOTES
Crawley and vaginal packing removed this am.
Incentive Spirometry education and demonstration completed by Respiratory Therapy Yes      Response to education: Good     Teaching Time: 5 minutes    Minimum Predicted Vital Capacity - 524 mL. Patient's Actual Vital Capacity - 650 mL. Turning over to Nursing for routine follow-up Yes.     Comments:     Electronically signed by Jorgito Tellez RCP on 1/16/2020 at 6:25 PM2
Teaching / education initiated regarding perioperative experience, expectations, and pain management during stay. Patient verbalized understanding.
[facility].  Milagros Thurman not bring valuables (money, credit cards, checkbooks, etc.) Do not wear any makeup (including no eye makeup) or nail polish on your fingers or toes. 10. DO NOT wear any jewelry or piercings on day of surgery. All body piercing jewelry must be removed. 11. If you have ___dentures, they will be removed before going to the OR; we will provide you a container. If you wear ___contact lenses or __x_glasses, they will be removed; please bring a case for them. 12. Please see your family doctor/pediatrician for a history & physical and/or concerning medications. Bring any test results/reports from your physician's office. PCP___staley_____________Phone___________H&P Appt. Date__1/8______             13 If you  have a Living Will and Durable Power of  for Healthcare, please bring in a copy. 15. Notify your Surgeon if you develop any illness between now and surgery  time, cough, cold, fever, sore throat, nausea, vomiting, etc.  Please notify your surgeon if you experience dizziness, shortness of breath or blurred vision between now & the time of your surgery             15. DO NOT shave your operative site 96 hours prior to surgery. For face & neck surgery, men may use an electric razor 48 hours prior to surgery. 16. Shower the night before or morning of surgery as directed. 17. To provide excellent care visitors will be limited to one in the room at any given time. 18.  Please bring picture ID and insurance card. 19.  Visit our web site for additional information:  [email or web address]              20.During flu season no children under the age of 15 are permitted in the hospital for the safety of all patients.                               21. If you take a long acting insulin in the evening only  take half of your usual  dose the night  before your procedure              22. If you use a c-pap

## 2020-01-17 NOTE — DISCHARGE SUMMARY
Physician Discharge Summary     Patient ID:  Lyndon Carroll  [id number]  77 y.o.  1955    Admit date: 1/16/2020    Discharge date:      Admitting Physician: Natalia Starr    Discharge Diagnoses: Uterovaginal prolapse, complete [N81.3], stress urinary incontinence, bladder neck obstruction, osteopenia    Discharged Condition: STABLE    Procedures Performed: Robotic total hysterectomy bilateral salpingo-oophorectomy and sacrocolpopexy, posterior repair, TOT urethral sling, cystoscopy    Hospital Course: Patient was admitted on the day of surgery, and underwent an uncomplicated procedure. See dictated op note. Discharge Exam:  Appears well, AVSS, abdomen soft, appropriately tender, nondistended, BS present, incisions clean dry, intact    Disposition: Good    Patient Instructions: Activity: ambulate in house, no lifting or strenuous exercise for 6 weeks, no sex for 6-8 weeks and no driving while on analgesics    Diet: Regular    Wound Care: Keep wound clean and dry    Discharge Medication:    Parrish Foote   Home Medication Instructions [facility]:[id number]    Printed on:01/17/20 0840   Medication Information                      acetaminophen (TYLENOL) 500 MG tablet  Take 2 tablets by mouth 3 times daily as needed for Pain             denosumab (PROLIA) 60 MG/ML SOSY SC injection  Inject 1 mL into the skin once for 1 dose             fluorouracil (EFUDEX) 5 % cream  Apply topically 2 times daily Apply topically 2 times daily. ibuprofen (IBU) 800 MG tablet  Take 1 tablet by mouth every 8 hours as needed for Pain             nitrofurantoin, macrocrystal-monohydrate, (MACROBID) 100 MG capsule  Take 1 capsule by mouth 2 times daily for 5 days Fill if goes with catheter.   Take while catheter in             polyethylene glycol (GLYCOLAX) packet  Take 17 g by mouth 2 times daily             PROLIA 60 MG/ML SOLN SC injection  Inject 1 mL into the skin once for 1 dose One dose every 6 months Follow-up with Enio Lou in 3 weeks  Signed:  Enio Lou  1/17/2020  8:40 AM

## 2020-03-02 ENCOUNTER — PROCEDURE VISIT (OUTPATIENT)
Dept: ENDOCRINOLOGY | Age: 65
End: 2020-03-02
Payer: OTHER GOVERNMENT

## 2020-03-02 ENCOUNTER — HOSPITAL ENCOUNTER (OUTPATIENT)
Dept: GENERAL RADIOLOGY | Age: 65
Discharge: HOME | End: 2020-03-02
Payer: OTHER GOVERNMENT

## 2020-03-02 ENCOUNTER — OFFICE VISIT (OUTPATIENT)
Dept: ENDOCRINOLOGY | Age: 65
End: 2020-03-02
Payer: OTHER GOVERNMENT

## 2020-03-02 VITALS
DIASTOLIC BLOOD PRESSURE: 77 MMHG | SYSTOLIC BLOOD PRESSURE: 125 MMHG | HEIGHT: 63 IN | WEIGHT: 130.2 LBS | BODY MASS INDEX: 23.07 KG/M2

## 2020-03-02 PROCEDURE — 96372 THER/PROPH/DIAG INJ SC/IM: CPT | Performed by: INTERNAL MEDICINE

## 2020-03-02 PROCEDURE — 99214 OFFICE O/P EST MOD 30 MIN: CPT | Performed by: INTERNAL MEDICINE

## 2020-03-02 PROCEDURE — 77080 DXA BONE DENSITY AXIAL: CPT | Performed by: INTERNAL MEDICINE

## 2020-03-02 PROCEDURE — 77080 DXA BONE DENSITY AXIAL: CPT

## 2020-03-02 RX ORDER — NYSTATIN 100000 [USP'U]/G
POWDER TOPICAL
COMMUNITY
Start: 2020-01-22 | End: 2021-03-22 | Stop reason: ALTCHOICE

## 2020-03-02 NOTE — PROGRESS NOTES
[facility] Osteoporosis and [address] [address] [place] [address]  Phone [phone]  Fax [phone]    NAME: Obey Murphy   :  1955  CONSULT DATE: 10/08/2012  MOST RECENT VISIT: 2019  TODAYS DATE: 2020          Labs @ [facility] 2020    PROBLEMS. Osteoporosis by DXA 2008, lowest T-score -4.6, Z-score -2.6 in the spine (L3-L4)    Family history of osteoporosis, mother  Vitamin D deficiency, desirable 25-OH D is 30-60 ng/mL    27 ng/mL 2016    47 ng/mL 2018  Natural menopause age 54 (2004)  NEW 2019: dizziness  2020: hysterectomy, bladder suspension    CURRENT MANAGEMENT FOR OSTEOPOROSIS. Calcium, diet 1800 mg/d + OsCal-500 BID    900 mg/d milk, 300 mg/d yogurt, 300 mg/d cheese, 300 mg/d other   Vitamin D, with calcium 400 IU/d + supplement 2000 IU/d added 2016  Exercise, walks one mile daily, exercise bike  Pharmacologic therapy, Prolia 60 mg SQ twice yearly started 2014    PREVIOUS MEDICATIONS FOR OSTEOPOROSIS. Actonel [id number], changed by MD Vivian Giron -2012, stopped due to low BMD, again 10/2012-2014, changed to [address] [place] [address],[address]. None  OTC MEDICATIONS. Ibuprofen    CHIEF COMPLAINT. Here for followup of osteoporosis and monitoring treatment. No new related signs or symptoms. INTERVAL HISTORY. See problem list for chronic/inactive conditions. She received Prolia without side effects. No falls, near-falls or fractures. She feels well overall except for episodes of dizziness for the past few months. She has an appointment soon to have this looked into. FOR FULL DETAILS OF FAMILY HISTORY, PAST MEDICAL AND SURGICAL HISTORY, SOCIAL HISTORY, AND REVIEW OF SYSTEMS, SEE PATIENT QUESTIONNAIRE OF TODAYS DATE. PHYSICAL EXAMINATION. GENERAL. Well-nourished, well-developed, normally proportioned adult. APPEARANCE. Healthy. MENTAL STATUS. Cheerful and alert.   Oriented to time, place, and person. MUSCULOSKELETAL. Spinal contours are normal.  No spine tenderness to palpation or percussion. Two finger spaces between ribs and pelvis. No joint deformity. No edema. Gait steady without assistance. NEUROLOGICAL. Able to rise from chair without using arms. No apparent focal motor or sensory deficit. Reflexes brisk and symmetric. Coordination appears normal.       BONE DENSITY. Most recent done here using Hologic equipment. T-scores  Initial study: 11/05/2008 L3-L4 -4.6 left fem. neck -2.2   Current study: 03/02/2020 L3-L4 -3.8 left fem. neck -2.2        The table below shows bone mineral density (grams/cm2), the appropriate measure for comparing serial scans. An increase or decrease is significant based on precision studies done at our center according to the ISCD protocol. PA spine Proximal Femur (left)   Date L3-L4 Fem. neck  Trochanter Total hip    11/05/2008 0.599 0.602 0.530 0.778   07/27/2012 0.639 0.614 0.469 0.746   10/01/2013 0.646 0.583 0.480 0.712   10/14/2014 0.641 0.564 0.501 0.709   10/20/2015 0.625 0.568 0.503 0.726   12/20/2016 0.668 0.581 0.516 0.738   01/30/2018 0.702 0.579 0.502 0.727   02/25/2019 0.730 0.583 0.507 0.740   03/02/2020 0.683 0.609 0.526 0.759     IMPRESSION:  BONE DENSITY IS LOW, CONSISTENT WITH OSTEOPOROSIS. SINCE THE PREVIOUS DXA, BMD DID NOT CHANGE SIGNIFICANTLY IN THE LEFT HIP; IT IS LOWER IN THE SPINE BUT SIMILAR NOW TO 2018 AND BETTER THAN 2014, BEFORE STARTING PROLIA. COMPARED WITH 2014, BEFORE STARTING PROLIA, BMD IS HIGHER NOW IN THE SPINE, DTOTAL HIP AND FEMORAL NECK. Laboratory reports: 10/2010, Ca 9.4 Cr 0.7. 10/2012 Ca 9.2 Cr 0.7. 12/2016 Ca 8.6 C4 0.6.   01/2018 Ca 9.5 Cr 0.7. 02/2020 Ca 9.0 Cr 0.7. X-rays viewed: DXA printouts reviewed. ASSESSMENT. Osteoporosis with bone density lower than desirable and lower than expected for her age and sex due in part to her family history. Untreated, she is at high risk of fractures.  She did not have an adequate response to Atelvia. She is doing well with Prolia started 12/2014. Vitamin D deficiency has been corrected. PLANS. OK to give Prolia today and continue Prolia 60 mg SQ twice yearly. Return with DXA in 1 year. I spent 25 minutes face to face with this patient. Over 50% of that time was spent on counseling and care coordination. See assessment and plan for counseling and care coordination details. Pedro Luis Potts MD, Director, [facility]) Osteoporosis and Bone Health Services    CC: Skylar Maher MD

## 2020-03-07 NOTE — RESULT ENCOUNTER NOTE
[facility]) Osteoporosis and [address] [place] [address]   Phone [phone]  Fax [phone]    NAME: Crystal Ramires   : 1955   STUDY DATE: 2020     REFERRING PROVIDER: Adriana Salgado MD    INDICATION(S) FOR PERFORMING THE STUDY:  osteoporosis, age-related (M81.0)    CLINICAL INFORMATION PROVIDED BY THE PATIENT: 68-year-old woman. She started natural menopause at age 54. She has not taken estrogen No history of fragility fractures. No long-term corticosteroid use. She took Actonel [id number], [place] Neigh -2012 and again 10/640954/2014. Current treatment is with Prolia started 10/2014. EQUIPMENT: Hologic Discovery. POSITIONING: Good. REGIONS OF INTEREST: Correct. ARTIFACTS: None. STUDY VALID? Yes. Spine BMD is spuriously high because of generalized degenerative change;  L1 and L2 were deleted. T-scores  Initial study: 2008 L3-L4 -4.6 left fem. neck -2.2   Current study: 2020 L3-L4 -3.8 left fem. neck -2.2        The table below shows bone mineral density (grams/cm2), the appropriate measure for comparing serial scans. An increase or decrease is significant based on precision studies done at our center according to the ISCD protocol. PA spine Proximal Femur (left)   Date L3-L4 Fem. neck  Trochanter Total hip    2008 0.599 0.602 0.530 0.778   2012 0.639 0.614 0.469 0.746   10/01/2013 0.646 0.583 0.480 0.712   10/14/2014 0.641 0.564 0.501 0.709   10/20/2015 0.625 0.568 0.503 0.726   2016 0.668 0.581 0.516 0.738   2018 0.702 0.579 0.502 0.727   2019 0.730 0.583 0.507 0.740   2020 0.683 0.609 0.526 0.759     IMPRESSION:  BONE DENSITY IS LOW, CONSISTENT WITH OSTEOPOROSIS. SINCE THE PREVIOUS DXA, BMD DID NOT CHANGE SIGNIFICANTLY IN THE LEFT HIP; IT IS LOWER IN THE SPINE BUT SIMILAR NOW TO 2018 AND BETTER THAN 2014, BEFORE STARTING PROLIA.   COMPARED WITH , BEFORE STARTING PROLIA, BMD IS HIGHER NOW IN THE SPINE, DTOTAL HIP AND FEMORAL NECK. Consider repeating this study in 1-2 years to assess the patient's progress. _________________________________________________   Juliane Potts MD, Director, [facility]) Osteoporosis and [address] [place] [address]

## 2020-06-17 ENCOUNTER — HOSPITAL ENCOUNTER (OUTPATIENT)
Dept: WOMENS IMAGING | Age: 65
Discharge: HOME | End: 2020-06-17
Payer: OTHER GOVERNMENT

## 2020-06-17 PROCEDURE — 77067 SCR MAMMO BI INCL CAD: CPT

## 2020-08-21 ENCOUNTER — TELEPHONE (OUTPATIENT)
Dept: ENDOCRINOLOGY | Age: 65
End: 2020-08-21

## 2020-08-21 NOTE — TELEPHONE ENCOUNTER
Per Alva Savage at [facility] pharmacy, prolia will be delivered on 8/25/2020.

## 2020-09-04 ENCOUNTER — NURSE ONLY (OUTPATIENT)
Dept: ENDOCRINOLOGY | Age: 65
End: 2020-09-04
Payer: OTHER GOVERNMENT

## 2020-09-04 PROCEDURE — 96372 THER/PROPH/DIAG INJ SC/IM: CPT | Performed by: INTERNAL MEDICINE

## 2020-09-04 NOTE — PROGRESS NOTES
Prolia injection given to Dr. Familia Clemente         Patient. [facility] [id number]. Lot #     [id number]          Exp:    10/22       Injection given in  Left    Arm. Patient advised to wait in office 20 minutes in case of reaction such as SOB, rash, hives, itching.      Pt Supplied

## 2020-11-16 RX ORDER — DENOSUMAB 60 MG/ML
60 INJECTION SUBCUTANEOUS ONCE
Qty: 1 ML | Refills: 0 | Status: SHIPPED | OUTPATIENT
Start: 2020-11-16 | End: 2022-03-08

## 2021-02-18 ENCOUNTER — TELEPHONE (OUTPATIENT)
Dept: ENDOCRINOLOGY | Age: 66
End: 2021-02-18

## 2021-03-22 ENCOUNTER — HOSPITAL ENCOUNTER (OUTPATIENT)
Dept: GENERAL RADIOLOGY | Age: 66
Discharge: HOME | End: 2021-03-22
Payer: OTHER GOVERNMENT

## 2021-03-22 ENCOUNTER — PROCEDURE VISIT (OUTPATIENT)
Dept: ENDOCRINOLOGY | Age: 66
End: 2021-03-22

## 2021-03-22 ENCOUNTER — OFFICE VISIT (OUTPATIENT)
Dept: ENDOCRINOLOGY | Age: 66
End: 2021-03-22
Payer: COMMERCIAL

## 2021-03-22 VITALS
DIASTOLIC BLOOD PRESSURE: 66 MMHG | SYSTOLIC BLOOD PRESSURE: 115 MMHG | BODY MASS INDEX: 23.11 KG/M2 | HEIGHT: 63 IN | WEIGHT: 130.4 LBS

## 2021-03-22 DIAGNOSIS — M81.0 OSTEOPOROSIS, POSTMENOPAUSAL: Primary | ICD-10-CM

## 2021-03-22 DIAGNOSIS — E55.9 VITAMIN D DEFICIENCY: ICD-10-CM

## 2021-03-22 DIAGNOSIS — M81.0 OSTEOPOROSIS, POSTMENOPAUSAL: ICD-10-CM

## 2021-03-22 DIAGNOSIS — Z51.81 MEDICATION MONITORING ENCOUNTER: ICD-10-CM

## 2021-03-22 PROCEDURE — 99214 OFFICE O/P EST MOD 30 MIN: CPT | Performed by: INTERNAL MEDICINE

## 2021-03-22 PROCEDURE — 96372 THER/PROPH/DIAG INJ SC/IM: CPT | Performed by: INTERNAL MEDICINE

## 2021-03-22 PROCEDURE — 77080 DXA BONE DENSITY AXIAL: CPT

## 2021-03-22 PROCEDURE — 77080 DXA BONE DENSITY AXIAL: CPT | Performed by: INTERNAL MEDICINE

## 2021-03-22 RX ORDER — ESTRADIOL 0.1 MG/G
CREAM VAGINAL
COMMUNITY
Start: 2021-01-05

## 2021-03-22 NOTE — PROGRESS NOTES
[facility] ([facility]) Osteoporosis and [address], [address] [place] [address]  Phone [phone]  Fax [phone]    NAME: Anna Peter   :  1955  CONSULT DATE: 10/08/2012  MOST RECENT VISIT: 2020  TODAYS DATE: 2021          Labs @ [facility] 2020    PROBLEMS. Osteoporosis by DXA 2008, lowest T-score -4.6, Z-score -2.6 in the spine (L3-L4)    Family history of osteoporosis, mother  Vitamin D deficiency, desirable 25-OH D is 30-60 ng/mL    27 ng/mL 2016    47 ng/mL 2018  Natural menopause age 54 (2004)  NEW 2019: dizziness  2020: hysterectomy, bladder suspension    CURRENT MANAGEMENT FOR OSTEOPOROSIS. Calcium, diet 1800 mg/d + OsCal-500 BID    900 mg/d milk, 300 mg/d yogurt, 300 mg/d cheese, 300 mg/d other   Vitamin D, with calcium 400 IU/d + supplement 2000 IU/d added 2016  Exercise, walks one mile daily, exercise bike  Pharmacologic therapy, Prolia 60 mg SQ twice yearly started 2014    PREVIOUS MEDICATIONS FOR OSTEOPOROSIS. Actonel [id number], changed by MD Harjinder Smith -2012, stopped due to low BMD, again 10/2012-2014, changed to [address] [place] [address],[address]. None  OTC MEDICATIONS. Ibuprofen    CHIEF COMPLAINT. Here for followup of osteoporosis and monitoring treatment. No new related signs or symptoms. INTERVAL HISTORY. See problem list for chronic/inactive conditions. She received Prolia without side effects. No falls, near-falls or fractures. She feels well overall except for episodes of dizziness for the past few months. She has an appointment soon to have this looked into. FOR FULL DETAILS OF FAMILY HISTORY, PAST MEDICAL AND SURGICAL HISTORY, SOCIAL HISTORY, AND REVIEW OF SYSTEMS, SEE PATIENT QUESTIONNAIRE OF TODAYS DATE. PHYSICAL EXAMINATION. GENERAL. Well-nourished, well-developed, normally proportioned adult. APPEARANCE. Healthy. MENTAL STATUS. Cheerful and alert.   Oriented to time, place, and did not have an adequate response to Atelvia. She is doing well with Prolia started 12/2014. Vitamin D deficiency has been corrected. PLANS. OK to give Prolia today and continue Prolia 60 mg SQ twice yearly. Return with DXA in 1 year. Time spent today: 30-40 minutes. Bibi Potts MD, Director, [facility]) Osteoporosis and Bone Health Services    CC: Jessica Jamison MD

## 2021-03-22 NOTE — RESULT ENCOUNTER NOTE
[facility]) Osteoporosis and [address] [address]   Phone [phone]  Fax [phone]    NAME: Tiffanie Matute   : 1955   STUDY DATE: 2021     REFERRING PROVIDER: Luz Marina Lombardo MD    INDICATION(S) FOR PERFORMING THE STUDY:  osteoporosis, age-related (M81.0)    CLINICAL INFORMATION PROVIDED BY THE PATIENT: 82-year-old woman. She started natural menopause at age 54. She has not taken estrogen No history of fragility fractures. No long-term corticosteroid use. She took Actonel [id number], Clemmie Critchley -2012 and again 10/351943/2014. Current treatment is with Prolia started 10/2014. EQUIPMENT: Hologic Discovery. POSITIONING: Good. REGIONS OF INTEREST: Correct. ARTIFACTS: None. STUDY VALID? Yes. Spine BMD is spuriously high because of generalized degenerative change;  L1 and L2 were deleted. T-scores  Initial study: 2008 L3-L4 -4.6 left fem. neck -2.2   Current study: 2021 L3-L4 -3.6 left fem. neck -2.1        The table below shows bone mineral density (grams/cm2), the appropriate measure for comparing serial scans. An increase or decrease is significant based on precision studies done at our center according to the ISCD protocol. PA spine Proximal Femur (left)   Date L3-L4 Fem. neck  Trochanter Total hip    2008 0.599 0.602 0.530 0.778   2012 0.639 0.614 0.469 0.746   10/01/2013 0.646 0.583 0.480 0.712   10/14/2014 0.641 0.564 0.501 0.709   10/20/2015 0.625 0.568 0.503 0.726   2016 0.668 0.581 0.516 0.738   2018 0.702 0.579 0.502 0.727   2019 0.730 0.583 0.507 0.740   2020 0.683 0.609 0.526 0.759   2021 0.709 0.617 0.522 0.761     IMPRESSION:  BONE DENSITY IS LOW, CONSISTENT WITH OSTEOPOROSIS.    SINCE THE LAST DXA, BMD DID NOT CHANGE SIGNIFICANTLY IN THE SPINE OR LEFT HIP. COMPARED WITH , BEFORE STARTING PROLIA, BMD IS HIGHER NOW IN THE SPINE, TOTAL HIP AND FEMORAL NECK AND TRENDING UP IN THE TROCHANTER. Consider repeating this study in 1-2 years to assess the patient's progress. _________________________________________________   George Kristin Potts MD, Director, [facility] ([facility]) Osteoporosis and [address] [place] [address]

## 2021-07-22 ENCOUNTER — HOSPITAL ENCOUNTER (OUTPATIENT)
Dept: WOMENS IMAGING | Age: 66
Discharge: HOME | End: 2021-07-22
Payer: OTHER GOVERNMENT

## 2021-07-22 VITALS — WEIGHT: 130 LBS | BODY MASS INDEX: 23.04 KG/M2 | HEIGHT: 63 IN

## 2021-07-22 DIAGNOSIS — Z12.31 BREAST CANCER SCREENING BY MAMMOGRAM: ICD-10-CM

## 2021-07-22 PROCEDURE — 77067 SCR MAMMO BI INCL CAD: CPT

## 2021-09-23 ENCOUNTER — NURSE ONLY (OUTPATIENT)
Dept: ENDOCRINOLOGY | Age: 66
End: 2021-09-23
Payer: OTHER GOVERNMENT

## 2021-09-23 DIAGNOSIS — M81.0 OSTEOPOROSIS, POSTMENOPAUSAL: ICD-10-CM

## 2021-09-23 PROCEDURE — 96372 THER/PROPH/DIAG INJ SC/IM: CPT | Performed by: INTERNAL MEDICINE

## 2022-03-08 DIAGNOSIS — M81.0 OSTEOPOROSIS, POSTMENOPAUSAL: Primary | ICD-10-CM

## 2022-03-08 DIAGNOSIS — M81.0 OSTEOPOROSIS, POSTMENOPAUSAL: ICD-10-CM

## 2022-03-08 RX ORDER — DENOSUMAB 60 MG/ML
INJECTION SUBCUTANEOUS
Qty: 1 ML | Refills: 0 | Status: SHIPPED | OUTPATIENT
Start: 2022-03-08 | End: 2022-03-09

## 2022-03-09 RX ORDER — DENOSUMAB 60 MG/ML
INJECTION SUBCUTANEOUS
Qty: 1 ML | Refills: 0 | Status: SHIPPED | OUTPATIENT
Start: 2022-03-09 | End: 2022-09-28 | Stop reason: SDUPTHER

## 2022-03-15 ENCOUNTER — TELEPHONE (OUTPATIENT)
Dept: ENDOCRINOLOGY | Age: 67
End: 2022-03-15

## 2022-04-04 ENCOUNTER — OFFICE VISIT (OUTPATIENT)
Dept: ENDOCRINOLOGY | Age: 67
End: 2022-04-04
Payer: OTHER GOVERNMENT

## 2022-04-04 ENCOUNTER — PROCEDURE VISIT (OUTPATIENT)
Dept: ENDOCRINOLOGY | Age: 67
End: 2022-04-04

## 2022-04-04 ENCOUNTER — HOSPITAL ENCOUNTER (OUTPATIENT)
Dept: GENERAL RADIOLOGY | Age: 67
Discharge: HOME | End: 2022-04-04
Payer: OTHER GOVERNMENT

## 2022-04-04 VITALS
WEIGHT: 128.6 LBS | DIASTOLIC BLOOD PRESSURE: 74 MMHG | BODY MASS INDEX: 22.79 KG/M2 | SYSTOLIC BLOOD PRESSURE: 135 MMHG | HEIGHT: 63 IN

## 2022-04-04 DIAGNOSIS — E55.9 VITAMIN D DEFICIENCY: ICD-10-CM

## 2022-04-04 DIAGNOSIS — M81.0 OSTEOPOROSIS, POSTMENOPAUSAL: Primary | ICD-10-CM

## 2022-04-04 DIAGNOSIS — M81.0 OSTEOPOROSIS, POSTMENOPAUSAL: ICD-10-CM

## 2022-04-04 DIAGNOSIS — Z51.81 MEDICATION MONITORING ENCOUNTER: ICD-10-CM

## 2022-04-04 PROCEDURE — 77080 DXA BONE DENSITY AXIAL: CPT | Performed by: INTERNAL MEDICINE

## 2022-04-04 PROCEDURE — 99214 OFFICE O/P EST MOD 30 MIN: CPT | Performed by: INTERNAL MEDICINE

## 2022-04-04 PROCEDURE — 77080 DXA BONE DENSITY AXIAL: CPT

## 2022-04-04 RX ORDER — ASPIRIN 81 MG/1
81 TABLET ORAL DAILY
COMMUNITY

## 2022-04-04 NOTE — PROGRESS NOTES
Prolia given per physician order. Prolia supplied by the patient. No adverse reaction noted. It is the patient's responsibility to notify the physician office of new insurance plan or new identification number prior to appointment to prevent delay in treatment. Please send a copy of the front and back of the insurance card either by fax, mail or stop by the office.

## 2022-04-04 NOTE — RESULT ENCOUNTER NOTE
Health Osteoporosis and [address] [facility] [address] [place] [address], [address] [place] [address]  Phone [phone]  Fax [phone]    NAME: Sandip Marinelli   : 1955   STUDY DATE: 2022     REFERRING PROVIDER: Mary Taylor MD    INDICATION(S) FOR PERFORMING THE STUDY:  osteoporosis, age-related (M81.0)    CLINICAL INFORMATION PROVIDED BY THE PATIENT: 62-year-old woman. She started natural menopause at age 54. She has not taken estrogen No history of fragility fractures. No long-term corticosteroid use. She took Actonel [id number], Allan He -2012 and again 10/741853/2014. Current treatment is with Prolia started 10/2014. EQUIPMENT: Hologic Discovery. POSITIONING: Good. REGIONS OF INTEREST: Correct. ARTIFACTS: None. STUDY VALID? Yes. Spine BMD is spuriously high because of generalized degenerative change;  L1 and L2 were deleted. T-scores  Initial study: 2008 L3-L4 -4.6 left fem. neck -2.2   Current study: 2022 L3-L4 -3.4 left fem. neck -1.9        The table below shows bone mineral density (grams/cm2), the appropriate measure for comparing serial scans. An increase or decrease is significant based on precision studies done at our center according to the ISCD protocol. PA spine Proximal Femur (left)   Date L3-L4 Fem. neck  Trochanter Total hip    2008 0.599 0.602 0.530 0.778   2012 0.639 0.614 0.469 0.746   10/14/2014 0.641 0.564 0.501 0.709   2016 0.668 0.581 0.516 0.738   2018 0.702 0.579 0.502 0.727   2019 0.730 0.583 0.507 0.740   2021 0.709 0.617 0.522 0.761   2022 0.723 0.634 0.519 0.771     IMPRESSION:  BONE DENSITY IS LOW, CONSISTENT WITH OSTEOPOROSIS. SINCE THE LAST DXA, BMD IS TRENDING UP IN THE SPINE AND FEMORAL NECK. COMPARED WITH 2014, BEFORE STARTING PROLIA, BMD IS HIGHER NOW IN THE SPINE, TOTAL HIP AND FEMORAL NECK AND TRENDING UP IN THE TROCHANTER.      Consider repeating this study in 1-2 years to assess the patient's progress. _________________________________________________   Faye Potts MD, Director, [facility] ([facility]) Osteoporosis and [address] [facility]

## 2022-04-04 NOTE — PROGRESS NOTES
[facility] ([facility]) Osteoporosis and [address] [place] [address]., [address] [place] [address]  Phone [phone]  Fax [phone]    NAME: Jemma Morales   :  1955  CONSULT DATE: 10/08/2012  MOST RECENT VISIT: 2021  TODAYS DATE: 2022          Labs @ [facility] 2022    PROBLEMS. Osteoporosis by DXA 2008, lowest T-score -4.6, Z-score -2.6 in the spine (L3-L4)    Family history of osteoporosis, mother  Vitamin D deficiency, desirable 25-OH D is 30-60 ng/mL    27 ng/mL 2016    47 ng/mL 2018  Natural menopause age 54 (2004)  NEW 2019: dizziness  2020: hysterectomy, bladder suspension    CURRENT MANAGEMENT FOR OSTEOPOROSIS. Calcium, diet 1800 mg/d + OsCal-500 BID    900 mg/d milk, 300 mg/d yogurt, 300 mg/d cheese, 300 mg/d other   Vitamin D, with calcium 400 IU/d + supplement 2000 IU/d added 2016  Exercise, walks one mile daily, exercise bike  Pharmacologic therapy, Prolia 60 mg SQ twice yearly started 2014    PREVIOUS MEDICATIONS FOR OSTEOPOROSIS. Actonel [id number], changed by MD Harriett Palma -2012, stopped due to low BMD, again 10/2012-2014, changed to [address] [place] [address],[address]. None  OTC MEDICATIONS. Ibuprofen    CHIEF COMPLAINT. Here for followup of osteoporosis and monitoring treatment. No new related signs or symptoms. INTERVAL HISTORY. See problem list for chronic/inactive conditions. She received Prolia without side effects. No falls, near-falls or fractures. Feels well overall. FOR FULL DETAILS OF FAMILY HISTORY, PAST MEDICAL AND SURGICAL HISTORY, SOCIAL HISTORY, AND REVIEW OF SYSTEMS, SEE PATIENT QUESTIONNAIRE OF TODAYS DATE. PHYSICAL EXAMINATION. GENERAL. Well-nourished, well-developed, normally proportioned adult. APPEARANCE. Healthy. MENTAL STATUS. Cheerful and alert. Oriented to time, place, and person. MUSCULOSKELETAL. Spinal contours are normal.  No spine tenderness to palpation or percussion.    Two finger spaces between ribs and pelvis. No joint deformity. No edema. Gait steady without assistance. NEUROLOGICAL. Able to rise from chair without using arms. No apparent focal motor or sensory deficit. Reflexes brisk and symmetric. Coordination appears normal.       BONE DENSITY. Most recent done here using Hologic equipment. T-scores  Initial study: 11/05/2008 L3-L4 -4.6 left fem. neck -2.2   Current study: 04/04/2022 L3-L4 -3.4 left fem. neck -1.9        The table below shows bone mineral density (grams/cm2), the appropriate measure for comparing serial scans. An increase or decrease is significant based on precision studies done at our center according to the ISCD protocol. PA spine Proximal Femur (left)   Date L3-L4 Fem. neck  Trochanter Total hip    11/05/2008 0.599 0.602 0.530 0.778   07/27/2012 0.639 0.614 0.469 0.746   10/14/2014 0.641 0.564 0.501 0.709   12/20/2016 0.668 0.581 0.516 0.738   01/30/2018 0.702 0.579 0.502 0.727   02/25/2019 0.730 0.583 0.507 0.740   03/22/2021 0.709 0.617 0.522 0.761   04/04/2022 0.723 0.634 0.519 0.771     IMPRESSION:  BONE DENSITY IS LOW, CONSISTENT WITH OSTEOPOROSIS. SINCE THE LAST DXA, BMD IS TRENDING UP IN THE SPINE AND FEMORAL NECK. COMPARED WITH 2014, BEFORE STARTING PROLIA, BMD IS HIGHER NOW IN THE SPINE, TOTAL HIP AND FEMORAL NECK AND TRENDING UP IN THE TROCHANTER. Laboratory reports: 10/2010, Ca 9.4 Cr 0.7. 10/2012 Ca 9.2 Cr 0.7. 12/2016 Ca 8.6 C4 0.6.   01/2018 Ca 9.5 Cr 0.7. 02/2020 Ca 9.0 Cr 0.7. 09/2020 Ca 9.2 Cr 0.8. 01/2022 Ca 9.4 Cr 0.8. X-rays viewed: DXA printouts reviewed. 09/2021 abd CT.    ASSESSMENT. Osteoporosis with bone density lower than desirable and lower than expected for her age and sex due in part to her family history. Untreated, she is at high risk of fractures. She did not have an adequate response to [facility]. She is doing well with Prolia started 12/2014. Vitamin D deficiency has been corrected. PLANS.  OK to give Prolia today and continue Prolia 60 mg SQ twice yearly. Return with DXA in 1 year. Time spent today: 30-39 minutes. Jennifer Potts MD, Director, [facility]) Osteoporosis and Bone Health Services    CC: Aneudy Conley MD

## 2022-08-13 ENCOUNTER — HOSPITAL ENCOUNTER (OUTPATIENT)
Dept: WOMENS IMAGING | Age: 67
Discharge: HOME | End: 2022-08-13
Payer: MEDICARE

## 2022-08-13 VITALS — BODY MASS INDEX: 23.04 KG/M2 | WEIGHT: 130 LBS | HEIGHT: 63 IN

## 2022-08-13 DIAGNOSIS — Z12.31 BREAST CANCER SCREENING BY MAMMOGRAM: ICD-10-CM

## 2022-08-13 PROCEDURE — 77067 SCR MAMMO BI INCL CAD: CPT

## 2022-09-28 DIAGNOSIS — M81.0 OSTEOPOROSIS, POSTMENOPAUSAL: ICD-10-CM

## 2022-09-28 RX ORDER — DENOSUMAB 60 MG/ML
INJECTION SUBCUTANEOUS
Qty: 1 ML | Refills: 0 | Status: SHIPPED | OUTPATIENT
Start: 2022-09-28

## 2022-10-06 ENCOUNTER — NURSE ONLY (OUTPATIENT)
Dept: ENDOCRINOLOGY | Age: 67
End: 2022-10-06
Payer: MEDICARE

## 2022-10-06 DIAGNOSIS — M81.0 OSTEOPOROSIS, POSTMENOPAUSAL: Primary | ICD-10-CM

## 2022-10-06 PROCEDURE — 96372 THER/PROPH/DIAG INJ SC/IM: CPT | Performed by: INTERNAL MEDICINE

## 2022-10-18 ENCOUNTER — TELEPHONE (OUTPATIENT)
Dept: ENDOCRINOLOGY | Age: 67
End: 2022-10-18

## 2023-03-15 DIAGNOSIS — M81.0 OSTEOPOROSIS, POSTMENOPAUSAL: ICD-10-CM

## 2023-03-15 RX ORDER — DENOSUMAB 60 MG/ML
INJECTION SUBCUTANEOUS
Qty: 1 ML | Refills: 0 | Status: SHIPPED | OUTPATIENT
Start: 2023-03-15

## 2023-04-13 PROBLEM — M25.561 PAIN, JOINT, KNEE, RIGHT: Status: ACTIVE | Noted: 2020-06-29

## 2023-04-13 PROBLEM — D47.3 ESSENTIAL THROMBOCYTHEMIA (HCC): Status: ACTIVE | Noted: 2021-09-10

## 2023-04-13 PROBLEM — K57.90 DIVERTICULAR DISEASE: Status: ACTIVE | Noted: 2023-04-13

## 2023-04-13 PROBLEM — D36.9 TUBULAR ADENOMA: Status: ACTIVE | Noted: 2023-04-13

## 2023-04-13 PROBLEM — E78.00 HIGH CHOLESTEROL: Status: ACTIVE | Noted: 2020-09-16

## 2023-04-25 ENCOUNTER — OFFICE VISIT (OUTPATIENT)
Dept: ENDOCRINOLOGY | Age: 68
End: 2023-04-25
Payer: COMMERCIAL

## 2023-04-25 ENCOUNTER — HOSPITAL ENCOUNTER (OUTPATIENT)
Dept: GENERAL RADIOLOGY | Age: 68
Discharge: HOME | End: 2023-04-25
Payer: COMMERCIAL

## 2023-04-25 ENCOUNTER — PROCEDURE VISIT (OUTPATIENT)
Dept: ENDOCRINOLOGY | Age: 68
End: 2023-04-25

## 2023-04-25 VITALS
BODY MASS INDEX: 24.17 KG/M2 | DIASTOLIC BLOOD PRESSURE: 80 MMHG | SYSTOLIC BLOOD PRESSURE: 139 MMHG | WEIGHT: 136.4 LBS | HEIGHT: 63 IN

## 2023-04-25 DIAGNOSIS — M81.0 OSTEOPOROSIS, POSTMENOPAUSAL: Primary | ICD-10-CM

## 2023-04-25 DIAGNOSIS — Z51.81 MEDICATION MONITORING ENCOUNTER: ICD-10-CM

## 2023-04-25 DIAGNOSIS — E55.9 VITAMIN D DEFICIENCY: ICD-10-CM

## 2023-04-25 DIAGNOSIS — M81.0 OSTEOPOROSIS, POSTMENOPAUSAL: ICD-10-CM

## 2023-04-25 PROCEDURE — 96372 THER/PROPH/DIAG INJ SC/IM: CPT | Performed by: INTERNAL MEDICINE

## 2023-04-25 PROCEDURE — 77080 DXA BONE DENSITY AXIAL: CPT

## 2023-04-25 PROCEDURE — 99214 OFFICE O/P EST MOD 30 MIN: CPT | Performed by: INTERNAL MEDICINE

## 2023-04-25 PROCEDURE — 1123F ACP DISCUSS/DSCN MKR DOCD: CPT | Performed by: INTERNAL MEDICINE

## 2023-04-25 RX ORDER — EPINEPHRINE 0.3 MG/.3ML
INJECTION SUBCUTANEOUS
COMMUNITY
Start: 2023-04-19

## 2023-04-25 RX ORDER — ACETAMINOPHEN 160 MG
TABLET,DISINTEGRATING ORAL
COMMUNITY

## 2023-04-25 NOTE — RESULT ENCOUNTER NOTE
[facility] and [address], [address] [place] [address],[place] [address]  Phone [phone]  Fax [phone]    NAME: Herbert Dove   : 1955   STUDY DATE: 2023     REFERRING PROVIDER: Denice Cline MD    INDICATION(S) FOR PERFORMING THE STUDY:  osteoporosis, age-related (M81.0)    CLINICAL INFORMATION PROVIDED BY THE PATIENT: 29-year-old woman. She started natural menopause at age 54. She has not taken estrogen No history of fragility fractures. No long-term corticosteroid use. She took Actonel [id number], Huong Jaky -2012 and again 10/122253/2014. Current treatment is with Prolia started 10/2014. EQUIPMENT: Hologic Discovery. POSITIONING: Good. REGIONS OF INTEREST: Correct. ARTIFACTS: None. STUDY VALID? Yes. Spine BMD is spuriously high because of generalized degenerative change;  L1 and L2 were deleted. T-scores  Initial study: 2008 L3-L4 -4.6 left fem. neck -2.2   Current study: 2023 L3-L4 -3.4 left fem. neck -1.9        The table below shows bone mineral density (grams/cm2), the appropriate measure for comparing serial scans. An increase or decrease is significant based on precision studies done at our center according to the ISCD protocol. PA spine Proximal Femur (left)   Date L3-L4 Fem. neck  Trochanter Total hip    2008 0.599 0.602 0.530 0.778   2012 0.639 0.614 0.469 0.746   10/14/2014 0.641 0.564 0.501 0.709   2016 0.668 0.581 0.516 0.738   2018 0.702 0.579 0.502 0.727   2019 0.730 0.583 0.507 0.740   2021 0.709 0.617 0.522 0.761   2022 0.723 0.634 0.519 0.771   2023 0.774 0.658 0.495 0.769     IMPRESSION:  BONE DENSITY IS LOW, CONSISTENT WITH OSTEOPOROSIS. SINCE THE LAST DXA, BMD INCREASED IN THE SPINE AND DID NOT CHANGE SIGNIFICANTLY IN THE LEFT HIP.  COMPARED WITH , BEFORE STARTING PROLIA, BMD IS HIGHER NOW IN THE SPINE, TOTAL HIP AND FEMORAL NECK.      Consider

## 2023-04-25 NOTE — PROGRESS NOTES
[facility]) Osteoporosis and [address] [place] [address]., [address] [place] [address]  Phone [phone]  Fax [phone]    NAME: Lawrence Sabillon   :  1955  CONSULT DATE: 10/08/2012  MOST RECENT VISIT: 2022  TODAY'S DATE: 2023          Labs @ [facility] 2023    PROBLEMS. Osteoporosis by DXA 2008, lowest T-score -4.6, Z-score -2.6 in the spine (L3-L4)    Family history of osteoporosis, mother  Vitamin D deficiency, desirable 25-OH D is 30-60 ng/mL    27 ng/mL 2016    47 ng/mL 2018    31 ng/mL 2023, advised to increase D by 2000 IU/d  Natural menopause age 54 ()  NEW 2019: dizziness  2020: hysterectomy, bladder suspension    CURRENT MANAGEMENT FOR OSTEOPOROSIS. Calcium, diet 1800 mg/d + OsCal-500 BID    900 mg/d milk, 300 mg/d yogurt, 300 mg/d cheese, 300 mg/d other   Vitamin D, with calcium 400 IU/d + supplement 2000 IU/d added 2016  Exercise, walks one mile daily, exercise bike  Pharmacologic therapy, Prolia 60 mg SQ twice yearly started 2014    PREVIOUS MEDICATIONS FOR OSTEOPOROSIS. Actonel [id number], changed by MD Anastasia Gale -2012, stopped due to low BMD, again 10/2012-2014, changed to [address] [place] [address],[address]. None  OTC MEDICATIONS. Ibuprofen    CHIEF COMPLAINT. Here for followup of osteoporosis and monitoring treatment. No new related signs or symptoms. INTERVAL HISTORY. See problem list for chronic/inactive conditions. She received Prolia without side effects. No falls, near-falls or fractures. Feels well overall. FOR FULL DETAILS OF FAMILY HISTORY, PAST MEDICAL AND SURGICAL HISTORY, SOCIAL HISTORY, AND REVIEW OF SYSTEMS, SEE PATIENT QUESTIONNAIRE OF TODAY'S DATE. PHYSICAL EXAMINATION. GENERAL. Well-nourished, well-developed, normally proportioned adult. APPEARANCE. Healthy. MENTAL STATUS. Cheerful and alert. Oriented to time, place, and person. MUSCULOSKELETAL.   Spinal contours are normal.  No spine

## 2023-04-25 NOTE — PROGRESS NOTES
Patient supplied the Prolia. Informed patient if any signs of redness,rash,swelling or unusual symptoms occur, please contact the office. Prolia given per physician order. It is the patient's responsibility to notify the physician office of new insurance plan or new identification number prior to appointment to prevent delay in treatment. Please send a copy of the front and back of the insurance card either by fax, mail or stop by the office.

## 2023-08-03 ENCOUNTER — OFFICE VISIT (OUTPATIENT)
Dept: FAMILY MEDICINE CLINIC | Age: 68
End: 2023-08-03
Payer: COMMERCIAL

## 2023-08-03 VITALS
TEMPERATURE: 97.5 F | DIASTOLIC BLOOD PRESSURE: 92 MMHG | HEART RATE: 73 BPM | SYSTOLIC BLOOD PRESSURE: 142 MMHG | OXYGEN SATURATION: 98 %

## 2023-08-03 DIAGNOSIS — I10 PRIMARY HYPERTENSION: ICD-10-CM

## 2023-08-03 DIAGNOSIS — M19.042 OA (OSTEOARTHRITIS) OF FINGER, LEFT: Primary | ICD-10-CM

## 2023-08-03 PROCEDURE — 3077F SYST BP >= 140 MM HG: CPT | Performed by: NURSE PRACTITIONER

## 2023-08-03 PROCEDURE — 1123F ACP DISCUSS/DSCN MKR DOCD: CPT | Performed by: NURSE PRACTITIONER

## 2023-08-03 PROCEDURE — 3080F DIAST BP >= 90 MM HG: CPT | Performed by: NURSE PRACTITIONER

## 2023-08-03 PROCEDURE — 99214 OFFICE O/P EST MOD 30 MIN: CPT | Performed by: NURSE PRACTITIONER

## 2023-08-03 NOTE — PROGRESS NOTES
Kenyatta Jennings  : 1955  Encounter date: 8/3/2023    This toma 74 y.o. female who presents with  Chief Complaint   Patient presents with    Hand Pain     Left ring finger pops at knuckle when moved, losing  strength x3mos, no recent injury but does have an old injury       History of present illness:    HPI Pt is 74year old female for concerns regarding L ring finger clicking and slight discomfort with moving. Denies locking or swelling, denies redness. Pt has applied ice with little relief. In office BP readings elevated, reports FH, father with hypertension. Denies excessive caffeine or salt. Reports exercises regularly. Current Outpatient Medications on File Prior to Visit   Medication Sig Dispense Refill    EPINEPHrine (EPIPEN) 0.3 MG/0.3ML SOAJ injection       Cholecalciferol (VITAMIN D3) 50 MCG ( UT) CAPS Take by mouth      denosumab (PROLIA) 60 MG/ML SOSY SC injection TO BE ADMINISTERED IN PHYSICIAN'S OFFICE. INJECT ONE SYRINGE SUBCOUSLY ONCE EVERY 6 MONTHS. REFRIGERATE. USE WITHIN 14 DAYS ONCE AT ROOM TEMPERATURE. 1 mL 0    estradiol (ESTRACE) 0.1 MG/GM vaginal cream       ibuprofen (IBU) 800 MG tablet Take 1 tablet by mouth every 8 hours as needed for Pain 90 tablet 1    acetaminophen (TYLENOL) 500 MG tablet Take 2 tablets by mouth 3 times daily as needed for Pain 180 tablet 5    aspirin 81 MG EC tablet Take 1 tablet by mouth daily (Patient not taking: Reported on 2023)       No current facility-administered medications on file prior to visit.       Allergies   Allergen Reactions    Cefuroxime Axetil Nausea And Vomiting     ceftin     Past Medical History:   Diagnosis Date    Cancer ([address])     skin basal cell      Past Surgical History:   Procedure Laterality Date    COLPOPEXY N/A 2020    ROBOT SACRALCOLPOPEXY performed by Krys Chanel MD at [facility] (CERVIX STATUS UNKNOWN) N/A 2020    ROBOT TOTAL HYSTERECTOMY WITH BILATERAL SALPINGO-OOPHORECTOMY

## 2023-08-17 ENCOUNTER — HOSPITAL ENCOUNTER (OUTPATIENT)
Dept: WOMENS IMAGING | Age: 68
Discharge: HOME | End: 2023-08-17
Payer: COMMERCIAL

## 2023-08-17 ENCOUNTER — HOSPITAL ENCOUNTER (OUTPATIENT)
Dept: GENERAL RADIOLOGY | Age: 68
Discharge: HOME | End: 2023-08-17
Payer: COMMERCIAL

## 2023-08-17 ENCOUNTER — HOSPITAL ENCOUNTER (OUTPATIENT)
Age: 68
Discharge: HOME | End: 2023-08-17
Payer: COMMERCIAL

## 2023-08-17 VITALS — BODY MASS INDEX: 23.04 KG/M2 | WEIGHT: 130 LBS | HEIGHT: 63 IN

## 2023-08-17 DIAGNOSIS — M19.042 OA (OSTEOARTHRITIS) OF FINGER, LEFT: ICD-10-CM

## 2023-08-17 DIAGNOSIS — Z12.31 VISIT FOR SCREENING MAMMOGRAM: ICD-10-CM

## 2023-08-17 PROCEDURE — 77063 BREAST TOMOSYNTHESIS BI: CPT

## 2023-08-17 PROCEDURE — 73140 X-RAY EXAM OF FINGER(S): CPT

## 2023-09-01 DIAGNOSIS — M81.0 OSTEOPOROSIS, POSTMENOPAUSAL: ICD-10-CM

## 2023-09-05 RX ORDER — DENOSUMAB 60 MG/ML
INJECTION SUBCUTANEOUS
Qty: 1 EACH | Refills: 0 | Status: SHIPPED | OUTPATIENT
Start: 2023-09-05

## 2023-09-05 NOTE — TELEPHONE ENCOUNTER
Medication:   Requested Prescriptions     Pending Prescriptions Disp Refills    denosumab (PROLIA) 60 MG/ML SOSY SC injection [Pharmacy Med Name: Sania Jaime PFS 60MG/ML]  0     Sig: TO BE ADMINISTERED IN PHYSICIAN'S OFFICE. INJECT ONE SYRINGE SUBCUTANEOUSLY ONCE EVERY 6 MONTHS. REFRIGERATE. USE WITHIN 14 DAYS ONCE AT ROOM TEMPERATURE.        Last Filled:      Patient Phone Number: [phone] (home)     Last appt: 4/25/2023   Next appt: 11/1/2023    Last Labs DM: No results found for: LABA1C  Last Lipid:   Lab Results   Component Value Date/Time    CHOL 195 09/08/2020 07:14 AM    TRIG 60 09/08/2020 07:14 AM    HDL 63 04/14/2023 08:06 AM    LDLCALC 124 04/14/2023 08:06 AM     Last PSA: No results found for: PSA  Last Thyroid: No results found for: TSH, FT3, Z2TISQW, T4FREE, U3FTEVG

## 2023-09-18 ENCOUNTER — OFFICE VISIT (OUTPATIENT)
Dept: FAMILY MEDICINE CLINIC | Age: 68
End: 2023-09-18
Payer: COMMERCIAL

## 2023-09-18 VITALS
DIASTOLIC BLOOD PRESSURE: 88 MMHG | HEART RATE: 83 BPM | BODY MASS INDEX: 24.27 KG/M2 | OXYGEN SATURATION: 97 % | SYSTOLIC BLOOD PRESSURE: 138 MMHG | HEIGHT: 63 IN | WEIGHT: 137 LBS | TEMPERATURE: 97.2 F

## 2023-09-18 DIAGNOSIS — I10 PRIMARY HYPERTENSION: ICD-10-CM

## 2023-09-18 DIAGNOSIS — Z48.02 VISIT FOR SUTURE REMOVAL: ICD-10-CM

## 2023-09-18 DIAGNOSIS — Z00.00 ENCOUNTER FOR INITIAL ANNUAL WELLNESS VISIT (AWV) IN MEDICARE PATIENT: Primary | ICD-10-CM

## 2023-09-18 DIAGNOSIS — D47.3 ESSENTIAL THROMBOCYTHEMIA (HCC): ICD-10-CM

## 2023-09-18 PROCEDURE — 3079F DIAST BP 80-89 MM HG: CPT | Performed by: NURSE PRACTITIONER

## 2023-09-18 PROCEDURE — 3075F SYST BP GE 130 - 139MM HG: CPT | Performed by: NURSE PRACTITIONER

## 2023-09-18 PROCEDURE — G0438 PPPS, INITIAL VISIT: HCPCS | Performed by: NURSE PRACTITIONER

## 2023-09-18 PROCEDURE — 1123F ACP DISCUSS/DSCN MKR DOCD: CPT | Performed by: NURSE PRACTITIONER

## 2023-09-18 RX ORDER — LISINOPRIL 2.5 MG/1
2.5 TABLET ORAL DAILY
Qty: 30 TABLET | Refills: 0 | Status: SHIPPED | OUTPATIENT
Start: 2023-09-18

## 2023-09-18 ASSESSMENT — PATIENT HEALTH QUESTIONNAIRE - PHQ9
SUM OF ALL RESPONSES TO PHQ9 QUESTIONS 1 & 2: 0
SUM OF ALL RESPONSES TO PHQ QUESTIONS 1-9: 0
1. LITTLE INTEREST OR PLEASURE IN DOING THINGS: 0
SUM OF ALL RESPONSES TO PHQ QUESTIONS 1-9: 0
2. FEELING DOWN, DEPRESSED OR HOPELESS: 0
SUM OF ALL RESPONSES TO PHQ QUESTIONS 1-9: 0
SUM OF ALL RESPONSES TO PHQ QUESTIONS 1-9: 0

## 2023-09-18 NOTE — PROGRESS NOTES
[facility]    Patient is here for their Medicare Annual Wellness Visit. Pt concerned about increasing BP at home and in office. SBP ranges 130's to 140's/70's-80's. Pt is also needing 7 sutures removed from L index finger following ED visit, up to date on TDAP. Due for HM and vaccinations. No other concerns. Last eye exam: 3/2023  Last dental exam: 8/2023  Exercise: daily walking x20min  Diet: in general, a \"healthy\" diet  , 3 meals a day    How would you rate your overall health? : Good            9/18/2023     7:37 AM 4/13/2023     8:07 AM   Fall Risk   2 or more falls in past year? no no   Fall with injury in past year? no no           9/18/2023     7:38 AM 4/13/2023     8:07 AM   PHQ Scores   PHQ2 Score 0 0   PHQ9 Score 0 0         Do you always wear a seat belt in the car?: Yes      Have you noted any problems with hearing?: No  Have you noted any vision problems?: No  Do you have concerns about your sexual health?: no  In the past month how much has pain been an issue for you?:  A little bit  In the past month have you had issues with anxiety, loneliness, irritability or fatigue:  Not at all    Do you take opioid medications even sometimes? No (if using assess risk and whether other treatments would be beneficial)    Living Will: Yes,   Copy requested      Healthcare Decision Maker:    Primary Decision Maker: Kasey Gonzalez - [phone]    Secondary Decision Maker: Lenka Briceño - [phone]  Click here to complete Healthcare Decision Makers including selection of the Healthcare Decision Maker Relationship (ie \"Primary\"). Today we documented Decision Maker(s) consistent with Legal Next of Kin hierarchy. Who lives at home with you:   Do you have any pets? none  Do you have any services coming to your home (meals on wheels, home health, etc) ? : no      Do you need help with:  Using the phone:  No  Bathing: No  Dressing:  No  Toileting:

## 2023-10-02 ENCOUNTER — TELEPHONE (OUTPATIENT)
Dept: ENDOCRINOLOGY | Age: 68
End: 2023-10-02

## 2023-10-02 NOTE — TELEPHONE ENCOUNTER
Patient calling with new [employer] info for [facility]    ID # [id number]    [phone]    Patient will mail in a copy of front and back of card as well.

## 2023-10-03 ENCOUNTER — TELEPHONE (OUTPATIENT)
Dept: ENDOCRINOLOGY | Age: 68
End: 2023-10-03

## 2023-10-04 ENCOUNTER — TELEPHONE (OUTPATIENT)
Dept: ENDOCRINOLOGY | Age: 68
End: 2023-10-04

## 2023-10-04 NOTE — TELEPHONE ENCOUNTER
FYI:    Call from [facility] at [address] stating that the pt cancelled her Rx Prolia due to copay of $600     [facility] stated that he just wanted to make us aware     Please advise if there is an alternative for the patient

## 2023-10-04 NOTE — TELEPHONE ENCOUNTER
Call from [facility] at [address] stating that the pt cancelled her Rx Prolia due to copay of $600    [facility] stated that he just wanted to make us aware

## 2023-10-05 ENCOUNTER — OFFICE VISIT (OUTPATIENT)
Dept: FAMILY MEDICINE CLINIC | Age: 68
End: 2023-10-05
Payer: COMMERCIAL

## 2023-10-05 VITALS
TEMPERATURE: 97.5 F | BODY MASS INDEX: 24.89 KG/M2 | HEART RATE: 71 BPM | OXYGEN SATURATION: 98 % | DIASTOLIC BLOOD PRESSURE: 88 MMHG | SYSTOLIC BLOOD PRESSURE: 132 MMHG | WEIGHT: 138.3 LBS

## 2023-10-05 DIAGNOSIS — M62.838 MUSCLE SPASM: Primary | ICD-10-CM

## 2023-10-05 DIAGNOSIS — Z23 NEED FOR VACCINATION: ICD-10-CM

## 2023-10-05 PROCEDURE — 99213 OFFICE O/P EST LOW 20 MIN: CPT | Performed by: NURSE PRACTITIONER

## 2023-10-05 PROCEDURE — G0008 ADMIN INFLUENZA VIRUS VAC: HCPCS | Performed by: NURSE PRACTITIONER

## 2023-10-05 PROCEDURE — 1123F ACP DISCUSS/DSCN MKR DOCD: CPT | Performed by: NURSE PRACTITIONER

## 2023-10-05 PROCEDURE — 90694 VACC AIIV4 NO PRSRV 0.5ML IM: CPT | Performed by: NURSE PRACTITIONER

## 2023-10-05 RX ORDER — TIZANIDINE 4 MG/1
4 TABLET ORAL 3 TIMES DAILY PRN
Qty: 30 TABLET | Refills: 0 | Status: SHIPPED | OUTPATIENT
Start: 2023-10-05

## 2023-10-05 RX ORDER — IBUPROFEN 200 MG
200 TABLET ORAL EVERY 6 HOURS PRN
COMMUNITY
End: 2023-10-05

## 2023-10-05 RX ORDER — MELOXICAM 15 MG/1
15 TABLET ORAL DAILY PRN
Qty: 30 TABLET | Refills: 0 | Status: SHIPPED | OUTPATIENT
Start: 2023-10-05

## 2023-10-05 NOTE — PROGRESS NOTES
Ange Acosta  : 1955  Encounter date: 10/5/2023    This toma 79 y.o. female who presents with  Chief Complaint   Patient presents with    Leg Pain     Pain in right buttocks for 6 days, patient states when she first gets up it hurts worse, pain with every step, gets worse when sleeps, patient states it started after a 3 hour car ride. History of present illness:    HPI Pt is 79year old female with acute right sided lower back pain started 1 week ago following long car ride. Pt denies known trauma, heavy lifting or repetition. Pt has tried OTC NSAID with little relief. Denies urinary or bowel symptoms. Has never had lumbar imaging in past.    Current Outpatient Medications on File Prior to Visit   Medication Sig Dispense Refill    ibuprofen (ADVIL;MOTRIN) 200 MG tablet Take 1 tablet by mouth every 6 hours as needed for Pain      lisinopril (PRINIVIL;ZESTRIL) 2.5 MG tablet Take 1 tablet by mouth daily (Patient taking differently: Take 1 tablet by mouth daily Patient has not started yet.) 30 tablet 0    denosumab (PROLIA) 60 MG/ML SOSY SC injection TO BE ADMINISTERED IN PHYSICIAN'S OFFICE. INJECT ONE SYRINGE SUBCUTANEOUSLY ONCE EVERY 6 MONTHS. REFRIGERATE. USE WITHIN 14 DAYS ONCE AT ROOM TEMPERATURE. 1 each 0    EPINEPHrine (EPIPEN) 0.3 MG/0.3ML SOAJ injection       Cholecalciferol (VITAMIN D3) 50 MCG ( UT) CAPS Take by mouth      estradiol (ESTRACE) 0.1 MG/GM vaginal cream       acetaminophen (TYLENOL) 500 MG tablet Take 2 tablets by mouth 3 times daily as needed for Pain 180 tablet 5    aspirin 81 MG EC tablet Take 1 tablet by mouth daily (Patient not taking: Reported on 2023)      ibuprofen (IBU) 800 MG tablet Take 1 tablet by mouth every 8 hours as needed for Pain (Patient not taking: Reported on 10/5/2023) 90 tablet 1     No current facility-administered medications on file prior to visit.       Allergies   Allergen Reactions    Cefuroxime Axetil Nausea And Vomiting     ceftin     Past

## 2023-10-05 NOTE — TELEPHONE ENCOUNTER
She has been taking Prolia since 2014. There is no satisfactory alternative.  $600 per dose is a lot, but Prolia cuts the risk of hip fracture almost in half, and $600 is much less than the cost for a hip fracture (and way less than the cost of a nursing home). If she stops Prolia, changing to Reclast or alendronate would be better than doing nothing but almost certainly she would lose ground (bone density) and protection against fracture is uncertain. With standard Medicare, there should be NO out-of-pocket cost for Medicare. She should consider changing to standard Medicare or select a different Medicare Advantage plan that provides better coverage for Prolia.

## 2023-10-06 ENCOUNTER — PATIENT MESSAGE (OUTPATIENT)
Dept: ENDOCRINOLOGY | Age: 68
End: 2023-10-06

## 2023-10-09 DIAGNOSIS — M81.0 OSTEOPOROSIS, POSTMENOPAUSAL: Primary | ICD-10-CM

## 2023-10-09 RX ORDER — DENOSUMAB 60 MG/ML
INJECTION SUBCUTANEOUS
Qty: 1 ML | Refills: 0 | Status: SHIPPED | OUTPATIENT
Start: 2023-10-09

## 2023-10-10 NOTE — TELEPHONE ENCOUNTER
The office does not know what tier the prolia is in with your insurance. This issue is between you and your insurance plan. Prolia injections should not go 4 weeks past the last injection, which you received the last one 4/25/23, the next injection is due 10/25/23 or no later then 11/25/23, for the medication to be effective. I can not make the decision as to when you want to take the prolia injection, that is your choice.     Message sent via [address] [place] [address]

## 2023-10-10 NOTE — TELEPHONE ENCOUNTER
[facility]  To: Dr. Ramona Seals: 10/6/2023 1:11 PM EDT  Subject: Yessi Marie scheduled for Nov. 1    Philippe Randle, I cancelled the proleia being sent from [address] . I was working through Dr. pabon office visits being covered by my new insurance. ( I have mailed a front and back image of my card to you. ). My [employer] rep indicated that Dr. pabon was in network and would be covered. Second minh: coverage for the proleia with insurance change. I was told when I called your office that you were calling me concerning that. So please call me and I would like to resubmit the script and be ready for my injection on Nov. 1.   Third minh: The imaging center in network is [facility]. [address] [place] [address] Rd [address]. [address]  I will need to go there in April 2024 before my next appt. with Dr. Lorraine Singer. Thank you!  And call anytime that you can,  [phone]

## 2023-10-16 DIAGNOSIS — M81.0 OSTEOPOROSIS, POSTMENOPAUSAL: Primary | ICD-10-CM

## 2023-10-16 RX ORDER — DENOSUMAB 60 MG/ML
INJECTION SUBCUTANEOUS
Qty: 1 ML | Refills: 0 | Status: SHIPPED | OUTPATIENT
Start: 2023-10-16

## 2023-10-16 NOTE — TELEPHONE ENCOUNTER
[facility]   Patient originally canceled the prolia rx but now is requesting a new prolia rx be sent to the pharmacy

## 2023-10-30 ENCOUNTER — TELEPHONE (OUTPATIENT)
Dept: ENDOCRINOLOGY | Age: 68
End: 2023-10-30

## 2023-10-30 NOTE — TELEPHONE ENCOUNTER
Nurse contact the patient related to prolia being shipped from [facility] pharmacy. Office has not received the prolia. Patient stated that she contacted this office on Thursday and was told we received the prolia. No phone encounter was placed  Patient did not remember whom she spoke with at this office.     Patient will contact [facility] related to prolia shipment and will let the office know the outcome

## 2023-10-30 NOTE — TELEPHONE ENCOUNTER
Pt called back and said [facility] told her we have to call them to set up over night delivery  [facility]: [phone]

## 2023-11-01 ENCOUNTER — NURSE ONLY (OUTPATIENT)
Dept: ENDOCRINOLOGY | Age: 68
End: 2023-11-01

## 2023-11-01 DIAGNOSIS — M81.0 OSTEOPOROSIS, POSTMENOPAUSAL: Primary | ICD-10-CM

## 2023-12-01 DIAGNOSIS — I10 PRIMARY HYPERTENSION: ICD-10-CM

## 2023-12-01 RX ORDER — LISINOPRIL 2.5 MG/1
2.5 TABLET ORAL DAILY
Qty: 30 TABLET | Refills: 0 | Status: SHIPPED | OUTPATIENT
Start: 2023-12-01

## 2023-12-01 NOTE — TELEPHONE ENCOUNTER
Medication:   Requested Prescriptions     Pending Prescriptions Disp Refills    lisinopril (PRINIVIL;ZESTRIL) 2.5 MG tablet [Pharmacy Med Name: LISINOPRIL 2.5 MG TABLET] 30 tablet 0     Sig: TAKE 1 TABLET BY MOUTH DAILY      Last Filled:      Patient Phone Number: [phone] (home)     Last appt: 10/5/2023   Next appt: Visit date not found    Last OARRS:        No data to display              PDMP Monitoring:    Last PDMP Temitope Murphy as Reviewed [facility]):  Review User Review Instant Review Result          Preferred Pharmacy:   [place] [id number] [address] [phone] [facility] [phone]  [address] [place] [address] Road  [address]  Phone: [phone] Fax: [phone]

## 2024-01-01 DIAGNOSIS — I10 PRIMARY HYPERTENSION: ICD-10-CM

## 2024-01-02 RX ORDER — LISINOPRIL 2.5 MG/1
2.5 TABLET ORAL DAILY
Qty: 90 TABLET | Refills: 1 | Status: SHIPPED | OUTPATIENT
Start: 2024-01-02

## 2024-02-15 ENCOUNTER — OFFICE VISIT (OUTPATIENT)
Dept: FAMILY MEDICINE CLINIC | Age: 69
End: 2024-02-15
Payer: COMMERCIAL

## 2024-02-15 VITALS
OXYGEN SATURATION: 97 % | DIASTOLIC BLOOD PRESSURE: 84 MMHG | TEMPERATURE: 98.4 F | SYSTOLIC BLOOD PRESSURE: 154 MMHG | HEART RATE: 73 BPM

## 2024-02-15 DIAGNOSIS — E55.9 VITAMIN D DEFICIENCY: ICD-10-CM

## 2024-02-15 DIAGNOSIS — E78.00 HIGH CHOLESTEROL: ICD-10-CM

## 2024-02-15 DIAGNOSIS — I10 PRIMARY HYPERTENSION: Primary | ICD-10-CM

## 2024-02-15 PROCEDURE — 1123F ACP DISCUSS/DSCN MKR DOCD: CPT | Performed by: NURSE PRACTITIONER

## 2024-02-15 PROCEDURE — 3077F SYST BP >= 140 MM HG: CPT | Performed by: NURSE PRACTITIONER

## 2024-02-15 PROCEDURE — 99214 OFFICE O/P EST MOD 30 MIN: CPT | Performed by: NURSE PRACTITIONER

## 2024-02-15 PROCEDURE — 3079F DIAST BP 80-89 MM HG: CPT | Performed by: NURSE PRACTITIONER

## 2024-02-15 RX ORDER — LISINOPRIL 5 MG/1
5 TABLET ORAL DAILY
Qty: 90 TABLET | Refills: 1
Start: 2024-02-15

## 2024-02-15 RX ORDER — ATORVASTATIN CALCIUM 20 MG/1
20 TABLET, FILM COATED ORAL DAILY
COMMUNITY
Start: 2023-11-30 | End: 2024-02-15 | Stop reason: ALTCHOICE

## 2024-02-15 NOTE — PROGRESS NOTES
Rosa Amato  : 1955  Encounter date: 2/15/2024    This toma 68 y.o. female who presents with  Chief Complaint   Patient presents with    Hypertension     Taking bp med for four months, feels like it isn't working  Home bps 150s  [facility] prescribed her atorvastatin 20mg, should she take it?       History of present illness:    HPI PT is 68 year old female with recently uncontrolled BP, reports home readings 140-160's/70-80's.  Pt reports symptomatic.  Pt is not compliant with taking medications, reports taking lisinopril 2.5 mg at night.    Pt also reports signing up for [employer] and started on atorvastatin 20 mg, reviewed recent labs  Total- 199  LDL- 124  HDL- 63  TG- 61  Pt reports not starting medication.  Pt is low cardiac risk, due for lab recheck.  No other concerns.    Current Outpatient Medications on File Prior to Visit   Medication Sig Dispense Refill    denosumab (PROLIA) 60 MG/ML SOSY SC injection Inject 60 mg/ml subcutaneous once every 6 months 1 mL 0    denosumab (PROLIA) 60 MG/ML SOSY SC injection Inject 60 mg/ml subcutaneous once every 6 months 1 mL 0    tiZANidine (ZANAFLEX) 4 MG tablet Take 1 tablet by mouth 3 times daily as needed (muscle spasm) 30 tablet 0    meloxicam (MOBIC) 15 MG tablet Take 1 tablet by mouth daily as needed for Pain 30 tablet 0    denosumab (PROLIA) 60 MG/ML SOSY SC injection TO BE ADMINISTERED IN PHYSICIAN'S OFFICE. INJECT ONE SYRINGE SUBCUTANEOUSLY ONCE EVERY 6 MONTHS. REFRIGERATE. USE WITHIN 14 DAYS ONCE AT ROOM TEMPERATURE. 1 each 0    EPINEPHrine (EPIPEN) 0.3 MG/0.3ML SOAJ injection       Cholecalciferol (VITAMIN D3) 50 MCG (2000) CAPS Take by mouth      estradiol (ESTRACE) 0.1 MG/GM vaginal cream       acetaminophen (TYLENOL) 500 MG tablet Take 2 tablets by mouth 3 times daily as needed for Pain 180 tablet 5     No current facility-administered medications on file prior to visit.      Allergies   Allergen Reactions    Cefuroxime     Cefuroxime Axetil Nausea

## 2024-03-06 ENCOUNTER — TELEPHONE (OUTPATIENT)
Dept: FAMILY MEDICINE CLINIC | Age: 69
End: 2024-03-06

## 2024-03-06 DIAGNOSIS — I10 PRIMARY HYPERTENSION: ICD-10-CM

## 2024-03-06 DIAGNOSIS — I10 PRIMARY HYPERTENSION: Primary | ICD-10-CM

## 2024-03-06 RX ORDER — LISINOPRIL 5 MG/1
5 TABLET ORAL DAILY
Qty: 90 TABLET | Refills: 1 | Status: SHIPPED | OUTPATIENT
Start: 2024-03-06

## 2024-03-06 RX ORDER — AMLODIPINE BESYLATE 5 MG/1
5 TABLET ORAL DAILY
Qty: 30 TABLET | Refills: 0 | Status: SHIPPED | OUTPATIENT
Start: 2024-03-06

## 2024-03-06 NOTE — TELEPHONE ENCOUNTER
Pt called with concerns of bp still running high. Would like to try another medication besides lisinopril 5mg.     Please advise...

## 2024-03-06 NOTE — TELEPHONE ENCOUNTER
Called Pt, gave information. States she will call back in two weeks with bp readings. Had no further questions or concerns at this time.

## 2024-03-07 ENCOUNTER — TELEPHONE (OUTPATIENT)
Dept: FAMILY MEDICINE CLINIC | Age: 69
End: 2024-03-07

## 2024-03-07 NOTE — TELEPHONE ENCOUNTER
See previous encounter about this for more information.    Called Pt and clarified that it is lisinopril and amlodipine. States she understood without further questions.

## 2024-03-07 NOTE — TELEPHONE ENCOUNTER
Pt called wondering if she is supposed to be on losartan. I did not see a script for it in her chart. Would like a call back.     Please advise...

## 2024-03-27 DIAGNOSIS — I10 PRIMARY HYPERTENSION: ICD-10-CM

## 2024-03-27 RX ORDER — AMLODIPINE BESYLATE 5 MG/1
5 TABLET ORAL DAILY
Qty: 90 TABLET | Refills: 0 | Status: SHIPPED | OUTPATIENT
Start: 2024-03-27

## 2024-04-22 ENCOUNTER — OFFICE VISIT (OUTPATIENT)
Dept: FAMILY MEDICINE CLINIC | Age: 69
End: 2024-04-22
Payer: COMMERCIAL

## 2024-04-22 VITALS — TEMPERATURE: 97.7 F | HEART RATE: 87 BPM | OXYGEN SATURATION: 96 %

## 2024-04-22 DIAGNOSIS — H01.004 BLEPHARITIS OF LEFT UPPER EYELID, UNSPECIFIED TYPE: Primary | ICD-10-CM

## 2024-04-22 PROCEDURE — 1123F ACP DISCUSS/DSCN MKR DOCD: CPT | Performed by: NURSE PRACTITIONER

## 2024-04-22 PROCEDURE — 99213 OFFICE O/P EST LOW 20 MIN: CPT | Performed by: NURSE PRACTITIONER

## 2024-04-22 RX ORDER — ERYTHROMYCIN 5 MG/G
OINTMENT OPHTHALMIC
Qty: 3.5 G | Refills: 0 | Status: SHIPPED | OUTPATIENT
Start: 2024-04-22 | End: 2024-05-02

## 2024-04-22 SDOH — ECONOMIC STABILITY: HOUSING INSECURITY
IN THE LAST 12 MONTHS, WAS THERE A TIME WHEN YOU DID NOT HAVE A STEADY PLACE TO SLEEP OR SLEPT IN A SHELTER (INCLUDING NOW)?: NO

## 2024-04-22 SDOH — ECONOMIC STABILITY: FOOD INSECURITY: WITHIN THE PAST 12 MONTHS, THE FOOD YOU BOUGHT JUST DIDN'T LAST AND YOU DIDN'T HAVE MONEY TO GET MORE.: NEVER TRUE

## 2024-04-22 SDOH — ECONOMIC STABILITY: FOOD INSECURITY: WITHIN THE PAST 12 MONTHS, YOU WORRIED THAT YOUR FOOD WOULD RUN OUT BEFORE YOU GOT MONEY TO BUY MORE.: NEVER TRUE

## 2024-04-22 SDOH — ECONOMIC STABILITY: INCOME INSECURITY: HOW HARD IS IT FOR YOU TO PAY FOR THE VERY BASICS LIKE FOOD, HOUSING, MEDICAL CARE, AND HEATING?: NOT HARD AT ALL

## 2024-04-22 NOTE — PROGRESS NOTES
Rosa Amato  : 1955  Encounter date: 2024    This toma 68 y.o. female who presents with  Chief Complaint   Patient presents with    Eye Problem     Left eye swollen, painful x1day  OTC warm compress       History of present illness:    HPI Pt is 68 year old female with concerns for L eye swelling and tenderness developed 1 day ago.  Denies known trauma, discharge or change in vision.  Reports possibly being bit by something but unknown.  Pt has applied warm compress with relief.  No other concerns at this time.    Current Outpatient Medications on File Prior to Visit   Medication Sig Dispense Refill    amLODIPine (NORVASC) 5 MG tablet TAKE 1 TABLET BY MOUTH DAILY 90 tablet 0    lisinopril (PRINIVIL;ZESTRIL) 5 MG tablet Take 1 tablet by mouth daily 90 tablet 1    denosumab (PROLIA) 60 MG/ML SOSY SC injection Inject 60 mg/ml subcutaneous once every 6 months 1 mL 0    denosumab (PROLIA) 60 MG/ML SOSY SC injection Inject 60 mg/ml subcutaneous once every 6 months 1 mL 0    tiZANidine (ZANAFLEX) 4 MG tablet Take 1 tablet by mouth 3 times daily as needed (muscle spasm) 30 tablet 0    meloxicam (MOBIC) 15 MG tablet Take 1 tablet by mouth daily as needed for Pain 30 tablet 0    denosumab (PROLIA) 60 MG/ML SOSY SC injection TO BE ADMINISTERED IN PHYSICIAN'S OFFICE. INJECT ONE SYRINGE SUBCUTANEOUSLY ONCE EVERY 6 MONTHS. REFRIGERATE. USE WITHIN 14 DAYS ONCE AT ROOM TEMPERATURE. 1 each 0    EPINEPHrine (EPIPEN) 0.3 MG/0.3ML SOAJ injection       Cholecalciferol (VITAMIN D3) 50 MCG ( UT) CAPS Take by mouth      estradiol (ESTRACE) 0.1 MG/GM vaginal cream       acetaminophen (TYLENOL) 500 MG tablet Take 2 tablets by mouth 3 times daily as needed for Pain 180 tablet 5     No current facility-administered medications on file prior to visit.      Allergies   Allergen Reactions    Cefuroxime     Cefuroxime Axetil Nausea And Vomiting     ceftin     Past Medical History:   Diagnosis Date    Cancer (HCC)     skin basal

## 2024-05-02 ENCOUNTER — OFFICE VISIT (OUTPATIENT)
Dept: FAMILY MEDICINE CLINIC | Age: 69
End: 2024-05-02
Payer: COMMERCIAL

## 2024-05-02 VITALS — OXYGEN SATURATION: 97 % | TEMPERATURE: 97.4 F | HEART RATE: 100 BPM

## 2024-05-02 DIAGNOSIS — S41.151A DOG BITE OF ARM, RIGHT, INITIAL ENCOUNTER: Primary | ICD-10-CM
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
## 2024-05-02 PROCEDURE — 99213 OFFICE O/P EST LOW 20 MIN: CPT | Performed by: NURSE PRACTITIONER

## 2024-05-02 PROCEDURE — 1123F ACP DISCUSS/DSCN MKR DOCD: CPT | Performed by: NURSE PRACTITIONER

## 2024-05-02 RX ORDER — AMOXICILLIN AND CLAVULANATE POTASSIUM 875; 125 MG/1; MG/1
1 TABLET, FILM COATED ORAL 2 TIMES DAILY
Qty: 20 TABLET | Refills: 0 | Status: SHIPPED | OUTPATIENT
Start: 2024-05-02 | End: 2024-05-12

## 2024-05-02 NOTE — PROGRESS NOTES
Rosa Amato  : 1955  Encounter date: 2024    This toma 68 y.o. female who presents with  Chief Complaint   Patient presents with    Animal Bite     Right elbow 15 minutes ago  Dog looked like a bulldog       History of present illness:    HPI PT is 68 year old female reports few minutes ago, got bit by dog in RUE.  Puncture wound.  Reports getting information from owners, reports family dog, not stray.  PT is up to date on TDAP .  Pt reports started on doxycycline by ophthalmologist for L upper lid stye, 6 days ABX, 10 day course.  Provided patient with information for [facility] to report issue, number provided.  Pt is not immuno-compromised.    Current Outpatient Medications on File Prior to Visit   Medication Sig Dispense Refill    erythromycin (ROMYCIN) 5 MG/GM ophthalmic ointment Apply topically to upper Left eyelid lash line twice daily 3.5 g 0    amLODIPine (NORVASC) 5 MG tablet TAKE 1 TABLET BY MOUTH DAILY 90 tablet 0    lisinopril (PRINIVIL;ZESTRIL) 5 MG tablet Take 1 tablet by mouth daily 90 tablet 1    denosumab (PROLIA) 60 MG/ML SOSY SC injection Inject 60 mg/ml subcutaneous once every 6 months 1 mL 0    denosumab (PROLIA) 60 MG/ML SOSY SC injection Inject 60 mg/ml subcutaneous once every 6 months 1 mL 0    tiZANidine (ZANAFLEX) 4 MG tablet Take 1 tablet by mouth 3 times daily as needed (muscle spasm) 30 tablet 0    meloxicam (MOBIC) 15 MG tablet Take 1 tablet by mouth daily as needed for Pain 30 tablet 0    denosumab (PROLIA) 60 MG/ML SOSY SC injection TO BE ADMINISTERED IN PHYSICIAN'S OFFICE. INJECT ONE SYRINGE SUBCUTANEOUSLY ONCE EVERY 6 MONTHS. REFRIGERATE. USE WITHIN 14 DAYS ONCE AT ROOM TEMPERATURE. 1 each 0    EPINEPHrine (EPIPEN) 0.3 MG/0.3ML SOAJ injection       Cholecalciferol (VITAMIN D3) 50 MCG ( UT) CAPS Take by mouth      estradiol (ESTRACE) 0.1 MG/GM vaginal cream       acetaminophen (TYLENOL) 500 MG tablet Take 2 tablets by mouth 3 times daily as

## 2024-05-07 ENCOUNTER — OFFICE VISIT (OUTPATIENT)
Dept: ENDOCRINOLOGY | Age: 69
End: 2024-05-07
Payer: COMMERCIAL

## 2024-05-07 ENCOUNTER — HOSPITAL ENCOUNTER (OUTPATIENT)
Dept: GENERAL RADIOLOGY | Age: 69
Discharge: HOME | End: 2024-05-07
Payer: COMMERCIAL

## 2024-05-07 VITALS — WEIGHT: 136.2 LBS | HEIGHT: 62 IN | BODY MASS INDEX: 25.06 KG/M2

## 2024-05-07 DIAGNOSIS — M81.0 OSTEOPOROSIS, POSTMENOPAUSAL: Primary | ICD-10-CM

## 2024-05-07 DIAGNOSIS — E55.9 VITAMIN D DEFICIENCY: ICD-10-CM

## 2024-05-07 DIAGNOSIS — M81.0 OSTEOPOROSIS, POSTMENOPAUSAL: ICD-10-CM

## 2024-05-07 DIAGNOSIS — Z51.81 MEDICATION MONITORING ENCOUNTER: ICD-10-CM

## 2024-05-07 PROCEDURE — 99214 OFFICE O/P EST MOD 30 MIN: CPT | Performed by: INTERNAL MEDICINE

## 2024-05-07 PROCEDURE — 96372 THER/PROPH/DIAG INJ SC/IM: CPT | Performed by: INTERNAL MEDICINE

## 2024-05-07 PROCEDURE — 77080 DXA BONE DENSITY AXIAL: CPT | Performed by: INTERNAL MEDICINE

## 2024-05-07 PROCEDURE — 77080 DXA BONE DENSITY AXIAL: CPT

## 2024-05-07 PROCEDURE — 1123F ACP DISCUSS/DSCN MKR DOCD: CPT | Performed by: INTERNAL MEDICINE

## 2024-05-07 RX ORDER — FLUOCINOLONE ACETONIDE 0.1 MG/ML
SOLUTION TOPICAL
COMMUNITY

## 2024-05-07 RX ORDER — NEOMYCIN SULFATE, POLYMYXIN B SULFATE, AND DEXAMETHASONE 3.5; 10000; 1 MG/G; [USP'U]/G; MG/G
OINTMENT OPHTHALMIC
COMMUNITY
Start: 2024-04-24

## 2024-05-07 RX ORDER — DOXYCYCLINE HYCLATE 100 MG/1
100 CAPSULE ORAL 2 TIMES DAILY
COMMUNITY

## 2024-05-07 RX ORDER — IMIQUIMOD 12.5 MG/.25G
CREAM TOPICAL
COMMUNITY

## 2024-05-07 NOTE — PROGRESS NOTES
[facility]  [address]  [facility] [address]  Phone [phone]  Fax [phone]    NAME: ZEB DIAZ   :  1955  CONSULT DATE: 10/08/2012  MOST RECENT VISIT: 2023  TODAY'S DATE: 2024          Labs @ [facility] 2023    PROBLEMS.  Osteoporosis by DXA 2008, lowest T-score -4.6, Z-score -2.6 in the spine (L3-L4)    Family history of osteoporosis, mother  Vitamin D deficiency, desirable 25-OH D is 30-60 ng/mL    27 ng/mL 2016    47 ng/mL 2018    31 ng/mL 2023, advised to increase D by 2000 IU/d  Natural menopause age 49 ()  NEW 2019: dizziness  2020: hysterectomy, bladder suspension    CURRENT MANAGEMENT FOR OSTEOPOROSIS.  Calcium, diet 1800 mg/d + OsCal-500 BID    900 mg/d milk, 300 mg/d yogurt, 300 mg/d cheese, 300 mg/d other   Vitamin D, with calcium 400 IU/d + supplement 2000 IU/d added 2016  Exercise, walks one mile daily, exercise bike  Pharmacologic therapy, Prolia 60 mg SQ twice yearly started 2014    PREVIOUS MEDICATIONS FOR OSTEOPOROSIS.   Actonel [id number], changed by MD Dugan -2012, stopped due to low BMD, again 10/2012-2014, changed to Prolia    OTHER CURRENT MEDICATIONS. None  OTC MEDICATIONS. Ibuprofen    CHIEF COMPLAINT. Here for followup of osteoporosis and monitoring treatment.  No new related signs or symptoms.    INTERVAL HISTORY.  See problem list for chronic/inactive conditions.  She received Prolia without side effects.  No falls, near-falls or fractures.  “I have a lot going on but it's not in your area.”    FOR FULL DETAILS OF FAMILY HISTORY, PAST MEDICAL AND SURGICAL HISTORY, SOCIAL HISTORY, SEE PATIENT QUESTIONNAIRE OF TODAY'S DATE.    PHYSICAL EXAMINATION.   GENERAL.  Well-nourished, well-developed, normally proportioned adult.   APPEARANCE. Healthy.  MENTAL STATUS. Cheerful and alert.  Oriented to time, place, and person.  MUSCULOSKELETAL.  Spinal contours are normal.  No

## 2024-05-16 ENCOUNTER — TELEPHONE (OUTPATIENT)
Dept: FAMILY MEDICINE CLINIC | Age: 69
End: 2024-05-16

## 2024-05-20 ENCOUNTER — OFFICE VISIT (OUTPATIENT)
Dept: FAMILY MEDICINE CLINIC | Age: 69
End: 2024-05-20
Payer: COMMERCIAL

## 2024-05-20 VITALS
TEMPERATURE: 97.4 F | HEART RATE: 62 BPM | BODY MASS INDEX: 25.4 KG/M2 | HEIGHT: 62 IN | WEIGHT: 138 LBS | DIASTOLIC BLOOD PRESSURE: 76 MMHG | OXYGEN SATURATION: 98 % | SYSTOLIC BLOOD PRESSURE: 126 MMHG

## 2024-05-20 DIAGNOSIS — Z00.00 ENCOUNTER FOR SUBSEQUENT ANNUAL WELLNESS VISIT (AWV) IN MEDICARE PATIENT: Primary | ICD-10-CM

## 2024-05-20 DIAGNOSIS — I10 PRIMARY HYPERTENSION: ICD-10-CM

## 2024-05-20 DIAGNOSIS — Z12.11 SCREEN FOR COLON CANCER: ICD-10-CM

## 2024-05-20 DIAGNOSIS — Z23 NEED FOR VACCINATION: ICD-10-CM

## 2024-05-20 PROCEDURE — 1123F ACP DISCUSS/DSCN MKR DOCD: CPT | Performed by: NURSE PRACTITIONER

## 2024-05-20 PROCEDURE — 90677 PCV20 VACCINE IM: CPT | Performed by: NURSE PRACTITIONER

## 2024-05-20 PROCEDURE — 3078F DIAST BP <80 MM HG: CPT | Performed by: NURSE PRACTITIONER

## 2024-05-20 PROCEDURE — G0439 PPPS, SUBSEQ VISIT: HCPCS | Performed by: NURSE PRACTITIONER

## 2024-05-20 PROCEDURE — G0009 ADMIN PNEUMOCOCCAL VACCINE: HCPCS | Performed by: NURSE PRACTITIONER

## 2024-05-20 PROCEDURE — 3074F SYST BP LT 130 MM HG: CPT | Performed by: NURSE PRACTITIONER

## 2024-05-20 RX ORDER — AMLODIPINE BESYLATE 5 MG/1
5 TABLET ORAL DAILY
Qty: 90 TABLET | Refills: 3 | Status: SHIPPED | OUTPATIENT
Start: 2024-05-20

## 2024-05-20 RX ORDER — LISINOPRIL 5 MG/1
5 TABLET ORAL DAILY
Qty: 90 TABLET | Refills: 3 | Status: SHIPPED | OUTPATIENT
Start: 2024-05-20

## 2024-05-20 ASSESSMENT — PATIENT HEALTH QUESTIONNAIRE - PHQ9
SUM OF ALL RESPONSES TO PHQ QUESTIONS 1-9: 0
SUM OF ALL RESPONSES TO PHQ QUESTIONS 1-9: 0
SUM OF ALL RESPONSES TO PHQ9 QUESTIONS 1 & 2: 0
2. FEELING DOWN, DEPRESSED OR HOPELESS: NOT AT ALL
SUM OF ALL RESPONSES TO PHQ QUESTIONS 1-9: 0
SUM OF ALL RESPONSES TO PHQ QUESTIONS 1-9: 0
1. LITTLE INTEREST OR PLEASURE IN DOING THINGS: NOT AT ALL

## 2024-05-20 NOTE — PROGRESS NOTES
No  Housework/Laundry: No  Medications: No  Money management: No    Does your home have:  Unsecured throw rugs: No  Grab bars in bathroom: No  Walk in shower: Yes  Seat in shower: No  Lit pathways for night (nightlights): Yes  PromoteSocial Alert System: No       Memory:  Have you or anyone close to you expressed concerns about your memory: No    Knows:  Month: Yes  Day: Yes  Year: Yes  Day of Week: Yes  Able to Recall (apple, boat, pencil) : Yes    Patient history:   Patient's medications, allergies, past medical, surgical, social and family histories were reviewed and updated in the EHR under History.       Social History     Substance and Sexual Activity   Alcohol Use No       Social History     Substance and Sexual Activity   Drug Use No       Social History     Tobacco Use   Smoking Status Never   Smokeless Tobacco Never           4/22/2024     8:04 AM 4/13/2023     8:07 AM   AMB SDOH   How hard is it for you to pay for the very basics like food, housing, medical care, and heating? Not hard Not hard   In the last 12 months, was there a time when you did not have a steady place to sleep or slept in a shelter (including now)? N N   In the past 12 months, has lack of transportation kept you from meetings, work, or from getting things needed for daily living? No No   Within the past 12 months, you worried that your food would run out before you got the money to buy more. Never true Never true   Within the past 12 months, the food you bought just didn't last and you didn't have money to get more. Never true Never true           Care Team:  Patient's list of care team members was updated in EHR under the Snap Shot.   Dentist:   Eye:   Hematology  Endocrinology  Dermatology    Immunizations: Reviewed with patient.   [id number]  RSV    Health Maintenance Due   Topic Date Due    Hepatitis C screen  Never done    Respiratory Syncytial Virus (RSV) Pregnant or age 60 yrs+ (1 - 1-dose 60+ series) Never done    Colorectal Cancer

## 2024-05-30 ENCOUNTER — TELEPHONE (OUTPATIENT)
Dept: ENDOCRINOLOGY | Age: 69
End: 2024-05-30

## 2024-10-02 ENCOUNTER — OFFICE VISIT (OUTPATIENT)
Dept: FAMILY MEDICINE CLINIC | Age: 69
End: 2024-10-02
Payer: COMMERCIAL

## 2024-10-02 VITALS — OXYGEN SATURATION: 99 % | TEMPERATURE: 97.1 F | HEART RATE: 78 BPM

## 2024-10-02 DIAGNOSIS — M25.551 HIP PAIN, CHRONIC, RIGHT: Primary | ICD-10-CM

## 2024-10-02 DIAGNOSIS — G89.29 HIP PAIN, CHRONIC, RIGHT: Primary | ICD-10-CM

## 2024-10-02 DIAGNOSIS — Z23 NEED FOR INFLUENZA VACCINATION: ICD-10-CM

## 2024-10-02 PROCEDURE — 1123F ACP DISCUSS/DSCN MKR DOCD: CPT | Performed by: NURSE PRACTITIONER

## 2024-10-02 PROCEDURE — 90653 IIV ADJUVANT VACCINE IM: CPT | Performed by: NURSE PRACTITIONER

## 2024-10-02 PROCEDURE — 99213 OFFICE O/P EST LOW 20 MIN: CPT | Performed by: NURSE PRACTITIONER

## 2024-10-02 PROCEDURE — G0008 ADMIN INFLUENZA VIRUS VAC: HCPCS | Performed by: NURSE PRACTITIONER

## 2024-10-02 NOTE — PROGRESS NOTES
Rosa Amato  : 1955  Encounter date: 10/2/2024    This toma 69 y.o. female who presents with  Chief Complaint   Patient presents with    Hip Pain     Right hip, felt like it was sciatica but it hasn't gone away, has been having having hip pain for awhile but recently got worse  NKI       History of present illness:    HPI PT is 69 year old female with chronic R hip pain, started 5 weeks ago.  PT denies injury/trauma, repetition.  PT has not had imaging completed.  PT with existing osteoporosis.  Pt denies radiculopathy, reports pain is worse with exertion and steps.  Feels like it \"catches\", reports some instability.  Pt has taken OTC NSAIDs, ice and stretches with mild relief.  Sometimes pain wakes patient in sleep.  No other concerns, denies change in bowels, swelling, bruising or rash, decreased ROM and strength.    Current Outpatient Medications on File Prior to Visit   Medication Sig Dispense Refill    amLODIPine (NORVASC) 5 MG tablet Take 1 tablet by mouth daily 90 tablet 3    lisinopril (PRINIVIL;ZESTRIL) 5 MG tablet Take 1 tablet by mouth daily 90 tablet 3    fluocinolone acetonide (SYNALAR) 0.01 % external solution As needed      imiquimod (ALDARA) 5 % cream As needed      denosumab (PROLIA) 60 MG/ML SOSY SC injection TO BE ADMINISTERED IN PHYSICIAN'S OFFICE. INJECT ONE SYRINGE SUBCUTANEOUSLY ONCE EVERY 6 MONTHS. REFRIGERATE. USE WITHIN 14 DAYS ONCE AT ROOM TEMPERATURE. 1 each 0    EPINEPHrine (EPIPEN) 0.3 MG/0.3ML SOAJ injection       Cholecalciferol (VITAMIN D3) 50 MCG (2000) CAPS Take by mouth      estradiol (ESTRACE) 0.1 MG/GM vaginal cream        No current facility-administered medications on file prior to visit.      Allergies   Allergen Reactions    Cefuroxime     Cefuroxime Axetil Nausea And Vomiting     ceftin     Past Medical History:   Diagnosis Date    Cancer (HCC)     skin basal cell      Past Surgical History:   Procedure Laterality Date    COLPOPEXY N/A 2020    ROBOT

## 2024-10-18 ENCOUNTER — TELEPHONE (OUTPATIENT)
Dept: FAMILY MEDICINE CLINIC | Age: 69
End: 2024-10-18

## 2024-10-18 RX ORDER — COLESEVELAM 180 1/1
625 TABLET ORAL 2 TIMES DAILY WITH MEALS
Qty: 60 TABLET | Refills: 0 | Status: SHIPPED | OUTPATIENT
Start: 2024-10-18 | End: 2024-11-17

## 2024-10-18 NOTE — TELEPHONE ENCOUNTER
Patient began having diarrhea on Wednesday. Began taking Imodium this am about 4 am and has still had 8-10 more bowel movement since taking medication. She would like to know if there is anything else that can be prescribed.     [facility] [id number] - [place], [place] - [address] - P [phone] - F [phone]  [address] [facility] [address]  Phone: [phone]  Fax: [phone]

## 2024-11-08 ENCOUNTER — TELEPHONE (OUTPATIENT)
Dept: ENDOCRINOLOGY | Age: 69
End: 2024-11-08

## 2024-11-14 ENCOUNTER — NURSE ONLY (OUTPATIENT)
Dept: ENDOCRINOLOGY | Age: 69
End: 2024-11-14
Payer: COMMERCIAL

## 2024-11-14 DIAGNOSIS — M81.0 OSTEOPOROSIS, POSTMENOPAUSAL: Primary | ICD-10-CM

## 2024-11-14 PROCEDURE — 96372 THER/PROPH/DIAG INJ SC/IM: CPT | Performed by: INTERNAL MEDICINE

## 2025-03-26 ENCOUNTER — TELEPHONE (OUTPATIENT)
Dept: FAMILY MEDICINE CLINIC | Age: 70
End: 2025-03-26

## 2025-03-26 NOTE — TELEPHONE ENCOUNTER
Spoke with patient, she stated that the office requires referral to bill under medial. Told patient we could not place a referral to chiropractics to my knowledge but she could reach out to them to see if there is a form that can be sent to us to fill out. Patient agreed and would let us know. Our office number and fax number was provided.

## 2025-03-26 NOTE — TELEPHONE ENCOUNTER
Patient calling wanting to get a referral for Chiropractic care. Patient went with a friend to their Chiropractor appointment and the doctor there did an adjustment on her as well and patient said it helped her a lot with the sciatica. Patient said the chiropractor advised her to contact her pcp for a referral.   The Chiropractor is:   Brandon Alegria at [facility]. Phone number- [phone]  Fax- [phone]    Please adivse

## 2025-03-27 NOTE — TELEPHONE ENCOUNTER
Patient requesting referral for chiropractics, patient has not been seen by us for back pain. Please call to schedule appt if still wanting referral.     Cannot place referral for chiropractics without diagnosis.

## 2025-04-01 ENCOUNTER — OFFICE VISIT (OUTPATIENT)
Dept: FAMILY MEDICINE CLINIC | Age: 70
End: 2025-04-01
Payer: MEDICARE

## 2025-04-01 VITALS
DIASTOLIC BLOOD PRESSURE: 76 MMHG | OXYGEN SATURATION: 97 % | SYSTOLIC BLOOD PRESSURE: 124 MMHG | HEART RATE: 75 BPM | TEMPERATURE: 97.9 F | BODY MASS INDEX: 24.73 KG/M2 | WEIGHT: 135.2 LBS

## 2025-04-01 DIAGNOSIS — E55.9 VITAMIN D DEFICIENCY: ICD-10-CM

## 2025-04-01 DIAGNOSIS — E78.00 HIGH CHOLESTEROL: ICD-10-CM

## 2025-04-01 DIAGNOSIS — M25.551 HIP PAIN, CHRONIC, RIGHT: Primary | ICD-10-CM

## 2025-04-01 DIAGNOSIS — B37.9 YEAST INFECTION: ICD-10-CM

## 2025-04-01 DIAGNOSIS — I10 PRIMARY HYPERTENSION: Primary | ICD-10-CM

## 2025-04-01 DIAGNOSIS — G89.29 HIP PAIN, CHRONIC, RIGHT: Primary | ICD-10-CM

## 2025-04-01 PROCEDURE — 1160F RVW MEDS BY RX/DR IN RCRD: CPT | Performed by: NURSE PRACTITIONER

## 2025-04-01 PROCEDURE — 99214 OFFICE O/P EST MOD 30 MIN: CPT | Performed by: NURSE PRACTITIONER

## 2025-04-01 PROCEDURE — 1159F MED LIST DOCD IN RCRD: CPT | Performed by: NURSE PRACTITIONER

## 2025-04-01 PROCEDURE — 1123F ACP DISCUSS/DSCN MKR DOCD: CPT | Performed by: NURSE PRACTITIONER

## 2025-04-01 RX ORDER — FLUCONAZOLE 150 MG/1
150 TABLET ORAL ONCE
Qty: 1 TABLET | Refills: 0 | Status: SHIPPED | OUTPATIENT
Start: 2025-04-01 | End: 2025-04-01

## 2025-04-01 SDOH — ECONOMIC STABILITY: FOOD INSECURITY: WITHIN THE PAST 12 MONTHS, YOU WORRIED THAT YOUR FOOD WOULD RUN OUT BEFORE YOU GOT MONEY TO BUY MORE.: NEVER TRUE

## 2025-04-01 SDOH — ECONOMIC STABILITY: FOOD INSECURITY: WITHIN THE PAST 12 MONTHS, THE FOOD YOU BOUGHT JUST DIDN'T LAST AND YOU DIDN'T HAVE MONEY TO GET MORE.: NEVER TRUE

## 2025-04-01 ASSESSMENT — PATIENT HEALTH QUESTIONNAIRE - PHQ9
SUM OF ALL RESPONSES TO PHQ QUESTIONS 1-9: 0
1. LITTLE INTEREST OR PLEASURE IN DOING THINGS: NOT AT ALL
SUM OF ALL RESPONSES TO PHQ QUESTIONS 1-9: 0
2. FEELING DOWN, DEPRESSED OR HOPELESS: NOT AT ALL
SUM OF ALL RESPONSES TO PHQ QUESTIONS 1-9: 0
SUM OF ALL RESPONSES TO PHQ QUESTIONS 1-9: 0

## 2025-04-01 NOTE — PROGRESS NOTES
Rosa Amato  : 1955  Encounter date: 2025    This toma 69 y.o. female who presents with  Chief Complaint   Patient presents with    Back Pain     Chiropractor referral back pain.       History of present illness:    HPI PT is 69 year old female requesting referral to Chiropractor, Logan Alegria, for chronic R hip pain with radiculopathy.  Reports has so far had 2 sessions with improvement.  Pt has had previous imaging for R hip 10/2024    No evidence of osteoarthritis, rheumatoid arthritis or inflammatory arthropathy.     Pt reports concerns for lumbar, pinched nerve or bone spurring.  Has not had imaging completed.  Will order xray.    PT concerned about chronic vaginal itching and discharge.  Reports taking OTC yeast medication with mild relief.  Denies lesions or concern for STI.    No other concerns at this time, due for labs and AWV at next OV.  Current Outpatient Medications on File Prior to Visit   Medication Sig Dispense Refill    colesevelam (WELCHOL) 625 MG tablet Take 1 tablet by mouth 2 times daily (with meals) 60 tablet 0    amLODIPine (NORVASC) 5 MG tablet Take 1 tablet by mouth daily 90 tablet 3    lisinopril (PRINIVIL;ZESTRIL) 5 MG tablet Take 1 tablet by mouth daily 90 tablet 3    fluocinolone acetonide (SYNALAR) 0.01 % external solution As needed      imiquimod (ALDARA) 5 % cream As needed      denosumab (PROLIA) 60 MG/ML SOSY SC injection TO BE ADMINISTERED IN PHYSICIAN'S OFFICE. INJECT ONE SYRINGE SUBCUTANEOUSLY ONCE EVERY 6 MONTHS. REFRIGERATE. USE WITHIN 14 DAYS ONCE AT ROOM TEMPERATURE. 1 each 0    EPINEPHrine (EPIPEN) 0.3 MG/0.3ML SOAJ injection       Cholecalciferol (VITAMIN D3) 50 MCG (2000) CAPS Take by mouth      estradiol (ESTRACE) 0.1 MG/GM vaginal cream        No current facility-administered medications on file prior to visit.      Allergies   Allergen Reactions    Cefuroxime     Cefuroxime Axetil Nausea And Vomiting     ceftin     Past Medical History:   Diagnosis Date

## 2025-04-03 ENCOUNTER — RESULTS FOLLOW-UP (OUTPATIENT)
Dept: FAMILY MEDICINE CLINIC | Age: 70
End: 2025-04-03

## 2025-04-22 ENCOUNTER — TELEPHONE (OUTPATIENT)
Dept: ENDOCRINOLOGY | Age: 70
End: 2025-04-22

## 2025-04-23 ENCOUNTER — TELEPHONE (OUTPATIENT)
Dept: ENDOCRINOLOGY | Age: 70
End: 2025-04-23

## 2025-04-23 NOTE — TELEPHONE ENCOUNTER
Updated Pradip with [facility] Medicare advantage ID: [id number] for prolia verification. Will keep patient posted.

## 2025-04-29 ENCOUNTER — TELEPHONE (OUTPATIENT)
Dept: ENDOCRINOLOGY | Age: 70
End: 2025-04-29

## 2025-04-29 NOTE — TELEPHONE ENCOUNTER
Prolia approved for Buy and Bill    Your Authorization Request Has Been Approved    Your authorization request number is [id number]     Authorization Start Date 04-   Authorization End Date 04-       If this requires a response please respond to the pool ( [facility] MEDICATION PRE-AUTH).      Thank you please advise patient.

## 2025-05-20 ENCOUNTER — OFFICE VISIT (OUTPATIENT)
Dept: ENDOCRINOLOGY | Age: 70
End: 2025-05-20
Payer: MEDICARE

## 2025-05-20 VITALS — WEIGHT: 133.4 LBS | HEIGHT: 62 IN | BODY MASS INDEX: 24.55 KG/M2

## 2025-05-20 DIAGNOSIS — E55.9 VITAMIN D DEFICIENCY: ICD-10-CM

## 2025-05-20 DIAGNOSIS — Z51.81 MEDICATION MONITORING ENCOUNTER: ICD-10-CM

## 2025-05-20 DIAGNOSIS — M81.0 OSTEOPOROSIS, POSTMENOPAUSAL: Primary | ICD-10-CM

## 2025-05-20 PROCEDURE — 96372 THER/PROPH/DIAG INJ SC/IM: CPT | Performed by: INTERNAL MEDICINE

## 2025-05-20 PROCEDURE — 1159F MED LIST DOCD IN RCRD: CPT | Performed by: INTERNAL MEDICINE

## 2025-05-20 PROCEDURE — 1123F ACP DISCUSS/DSCN MKR DOCD: CPT | Performed by: INTERNAL MEDICINE

## 2025-05-20 PROCEDURE — 99214 OFFICE O/P EST MOD 30 MIN: CPT | Performed by: INTERNAL MEDICINE

## 2025-05-20 NOTE — PROGRESS NOTES
[facility]  [address]  [facility] [address]  Phone [phone]  Fax [phone]    NAME: ZEB DIAZ   :  1955  CONSULT DATE: 10/08/2012  MOST RECENT VISIT: 2024  TODAY'S DATE: 2025          Labs @ [facility] 2024    PROBLEMS.  Osteoporosis by DXA 2008, lowest T-score -4.6, Z-score -2.6 in the spine (L3-L4)    Family history of osteoporosis, mother  Vitamin D deficiency, desirable 25-OH D is 30-60 ng/mL    27 ng/mL 2016    47 ng/mL 2018    31 ng/mL 2023, advised to increase D by 2000 IU/d    33 ng/mL 2024  Natural menopause age 49 ()  NEW 2019: dizziness  2020: hysterectomy, bladder suspension    CURRENT MANAGEMENT FOR OSTEOPOROSIS.  Calcium, diet 1800 mg/d + OsCal-500 BID    900 mg/d milk, 300 mg/d yogurt, 300 mg/d cheese, 300 mg/d other   Vitamin D, with calcium 400 IU/d + supplement 2000 IU/d added 2016  Exercise, walks one mile daily, exercise bike  Pharmacologic therapy, Prolia 60 mg SQ twice yearly started 2014    PREVIOUS MEDICATIONS FOR OSTEOPOROSIS.   Actonel [id number], changed by MD Dugan -2012, stopped due to low BMD, again 10/2012-2014, changed to Prolia    OTHER CURRENT MEDICATIONS. None  OTC MEDICATIONS. Ibuprofen    CHIEF COMPLAINT. Here for followup of osteoporosis and monitoring treatment.  No new related signs or symptoms.    INTERVAL HISTORY.  See problem list for chronic/inactive conditions.  She received Prolia without side effects.  No falls, near-falls or fractures.  “I have a lot going on but it's not in your area.”    FOR FULL DETAILS OF FAMILY HISTORY, PAST MEDICAL AND SURGICAL HISTORY, SOCIAL HISTORY, SEE PATIENT QUESTIONNAIRE OF TODAY'S DATE.    PHYSICAL EXAMINATION.   GENERAL.  Well-nourished, well-developed, normally proportioned adult.   APPEARANCE. Healthy.  MENTAL STATUS. Cheerful and alert.  Oriented to time, place, and person.  MUSCULOSKELETAL.  Spinal

## 2025-05-30 DIAGNOSIS — I10 PRIMARY HYPERTENSION: ICD-10-CM

## 2025-05-30 RX ORDER — LISINOPRIL 5 MG/1
5 TABLET ORAL DAILY
Qty: 90 TABLET | Refills: 0 | Status: SHIPPED | OUTPATIENT
Start: 2025-05-30

## 2025-05-30 RX ORDER — AMLODIPINE BESYLATE 5 MG/1
5 TABLET ORAL DAILY
Qty: 90 TABLET | Refills: 0 | Status: SHIPPED | OUTPATIENT
Start: 2025-05-30

## 2025-06-26 ENCOUNTER — RESULTS FOLLOW-UP (OUTPATIENT)
Dept: FAMILY MEDICINE CLINIC | Age: 70
End: 2025-06-26

## 2025-06-26 LAB
ALBUMIN: 4.4 G/DL (ref 3.9–4.9)
ALP BLD-CCNC: 85 IU/L (ref 44–121)
ALT SERPL-CCNC: 15 IU/L (ref 0–32)
AST SERPL-CCNC: 18 IU/L (ref 0–40)
BASOPHILS ABSOLUTE: 0.1 X10E3/UL (ref 0–0.2)
BASOPHILS RELATIVE PERCENT: 1 %
BILIRUB SERPL-MCNC: 0.6 MG/DL (ref 0–1.2)
BUN / CREAT RATIO: 26 (ref 12–28)
BUN BLDV-MCNC: 23 MG/DL (ref 8–27)
CALCIUM SERPL-MCNC: 9.2 MG/DL (ref 8.7–10.3)
CHLORIDE BLD-SCNC: 102 MMOL/L (ref 96–106)
CHOLESTEROL, TOTAL: 202 MG/DL (ref 100–199)
CO2: 23 MMOL/L (ref 20–29)
CREAT SERPL-MCNC: 0.88 MG/DL (ref 0.57–1)
CREATININE URINE: 148.9 MG/DL
EOSINOPHILS ABSOLUTE: 0.1 X10E3/UL (ref 0–0.4)
EOSINOPHILS RELATIVE PERCENT: 2 %
ESTIMATED GLOMERULAR FILTRATION RATE CREATININE EQUATION: 72 ML/MIN/1.73
GLOBULIN: 2.8 G/DL (ref 1.5–4.5)
GLUCOSE BLD-MCNC: 82 MG/DL (ref 70–99)
HCT VFR BLD CALC: 43.5 % (ref 34–46.6)
HDLC SERPL-MCNC: 61 MG/DL
HEMOGLOBIN: 14.1 G/DL (ref 11.1–15.9)
IMMATURE GRANS (ABS): 0 X10E3/UL (ref 0–0.1)
IMMATURE GRANULOCYTES %: 0 %
LDL CHOLESTEROL: 129 MG/DL (ref 0–99)
LYMPHOCYTES ABSOLUTE: 1.8 X10E3/UL (ref 0.7–3.1)
LYMPHOCYTES RELATIVE PERCENT: 30 %
MCH RBC QN AUTO: 31 PG (ref 26.6–33)
MCHC RBC AUTO-ENTMCNC: 32.4 G/DL (ref 31.5–35.7)
MCV RBC AUTO: 96 FL (ref 79–97)
MICROALBUMIN/CREAT 24H UR: 36.8 UG/ML
MICROALBUMIN/CREAT UR-RTO: 25 MG/G CREAT (ref 0–29)
MONOCYTES ABSOLUTE: 0.6 X10E3/UL (ref 0.1–0.9)
MONOCYTES RELATIVE PERCENT: 10 %
NEUTROPHILS ABSOLUTE: 3.5 X10E3/UL (ref 1.4–7)
NEUTROPHILS RELATIVE PERCENT: 57 %
PDW BLD-RTO: 13.2 % (ref 11.7–15.4)
PLATELET # BLD: 535 X10E3/UL (ref 150–450)
POTASSIUM SERPL-SCNC: 4.8 MMOL/L (ref 3.5–5.2)
RBC # BLD: 4.55 X10E6/UL (ref 3.77–5.28)
SODIUM BLD-SCNC: 138 MMOL/L (ref 134–144)
TOTAL PROTEIN: 7.2 G/DL (ref 6–8.5)
TRIGL SERPL-MCNC: 64 MG/DL (ref 0–149)
VITAMIN D 25-HYDROXY: 35.1 NG/ML (ref 30–100)
VLDLC SERPL CALC-MCNC: 12 MG/DL (ref 5–40)
WBC # BLD: 6.2 X10E3/UL (ref 3.4–10.8)

## 2025-07-08 ENCOUNTER — OFFICE VISIT (OUTPATIENT)
Dept: FAMILY MEDICINE CLINIC | Age: 70
End: 2025-07-08
Payer: MEDICARE

## 2025-07-08 VITALS
TEMPERATURE: 97.3 F | BODY MASS INDEX: 23.46 KG/M2 | SYSTOLIC BLOOD PRESSURE: 123 MMHG | HEART RATE: 75 BPM | OXYGEN SATURATION: 98 % | WEIGHT: 132.4 LBS | DIASTOLIC BLOOD PRESSURE: 77 MMHG | HEIGHT: 63 IN

## 2025-07-08 DIAGNOSIS — Z00.00 ENCOUNTER FOR SUBSEQUENT ANNUAL WELLNESS VISIT (AWV) IN MEDICARE PATIENT: Primary | ICD-10-CM

## 2025-07-08 DIAGNOSIS — M25.551 HIP PAIN, CHRONIC, RIGHT: ICD-10-CM

## 2025-07-08 DIAGNOSIS — M51.362 DEGENERATION OF INTERVERTEBRAL DISC OF LUMBAR REGION WITH DISCOGENIC BACK PAIN AND LOWER EXTREMITY PAIN: ICD-10-CM

## 2025-07-08 DIAGNOSIS — D47.3 ESSENTIAL THROMBOCYTHEMIA (HCC): ICD-10-CM

## 2025-07-08 DIAGNOSIS — G89.29 HIP PAIN, CHRONIC, RIGHT: ICD-10-CM

## 2025-07-08 PROCEDURE — 1160F RVW MEDS BY RX/DR IN RCRD: CPT | Performed by: NURSE PRACTITIONER

## 2025-07-08 PROCEDURE — G0439 PPPS, SUBSEQ VISIT: HCPCS | Performed by: NURSE PRACTITIONER

## 2025-07-08 PROCEDURE — 1123F ACP DISCUSS/DSCN MKR DOCD: CPT | Performed by: NURSE PRACTITIONER

## 2025-07-08 PROCEDURE — 1159F MED LIST DOCD IN RCRD: CPT | Performed by: NURSE PRACTITIONER

## 2025-07-08 NOTE — PROGRESS NOTES
MEDICARE ANNUAL WELLNESS VISIT    Patient is here for their Medicare Annual Wellness Visit.  Reviewed recent labs, continued elevated LDL, advise tighter control diet: continued thrombocytopenia.  Pt has established with chiropractor, Dr. Brandon Alegria, has been undergoing adjustments and PT for chronic R sided lumbar pain with radiculopathy, radiates to RLE, lateral calf with instability.    Pt has had lumbar xray 04/2025  1.  Lumbar spondylosis with moderate disc space narrowing L5-S1.   2.  No acute vertebral fracture.   3.  14 degree lumbar levoscoliosis.   4.  Listhesis measurements as above.   No other concerns at this time.  Pt being followed by Endocrinology for osteoporosis, receiving prolia injections.    Last eye exam: within the last year   Last dental exam: last week   Exercise:  every other day, yoga, walking, chiropractors exercises   Do you eat balanced/healthy meals regularly? Mostly     How would you rate your overall health? : Very good            5/20/2024     7:38 AM 9/18/2023     7:37 AM 4/13/2023     8:07 AM   Fall Risk   Do you feel unsteady or are you worried about falling?  no no yes   2 or more falls in past year? no no no   Fall with injury in past year? no no no           4/1/2025     9:31 AM 5/20/2024     7:38 AM 2/15/2024     8:20 AM 9/18/2023     7:38 AM 4/13/2023     8:07 AM   PHQ Scores   PHQ2 Score 0 0 0 0 0   PHQ9 Score 0 0 0 0 0         Do you always wear a seat belt in the car?: Yes      Have you noted any problems with hearing?: No  Have you noted any vision problems?: No  Do you have concerns about your sexual health including any concerns about having an STD or would you like to be tested for an STD?: no  In the past month how much has pain been an issue for you?:  A little bit  In the past month have you had issues with anxiety, loneliness, irritability or fatigue:  Not at all    Do you take opioid medications even sometimes? No (if using assess risk and whether other

## 2025-07-10 ENCOUNTER — TELEPHONE (OUTPATIENT)
Dept: FAMILY MEDICINE CLINIC | Age: 70
End: 2025-07-10

## 2025-07-10 NOTE — TELEPHONE ENCOUNTER
Freya from [facility] called requesting that office notes be sent over in order to get the MRI approved     Fax: [phone]

## 2025-07-18 ENCOUNTER — TELEPHONE (OUTPATIENT)
Dept: PHARMACY | Facility: CLINIC | Age: 70
End: 2025-07-18

## 2025-07-18 NOTE — TELEPHONE ENCOUNTER
Pt returned call: she states that she is taking this medication daily and still has a few left. She will contact the pharmacy for a refill. Informed her there should be a new rx on file at her pharmacy and we are happy to assist in the future if needed. Pt had no questions or concerns regarding her medication at this time.    Krystle London CPhT  Population Health Pharmacy   [facility]  [phone] Option 1

## 2025-07-18 NOTE — TELEPHONE ENCOUNTER
[facility]: ADHERENCE REVIEW  Identified care gap per [employer]: fills at [facility]: ACE/ARB adherence    Patient also appears to be prescribed: ACE/ARB    ASSESSMENT    ACE/ARB ADHERENCE    Insurance Records claims through 2025 (Prior Year PDC = not reported; YTD PDC = FIRST FILL; Potential Fail Date: 25):   LISINOPRIL TAB 5MG last filled on 25 for 90 day supply. Next refill due: 25    Prescribed si tablet/capsule daily    Per Reconcile Dispense History: last filled on 25 for 90 day supply.     No outreach tot he pharmacy - first fill     BP Readings from Last 3 Encounters:   25 123/77   25 124/76   24 126/76     Estimated Creatinine Clearance: 51 mL/min (based on SCr of 0.88 mg/dL).  Lab Results   Component Value Date    CREATININE 0.88 2025     Lab Results   Component Value Date    K 4.8 2025       PLAN  Per insurer report, LIS-0 - co-pays are based on tiers and patient is subject to coverage gap.    The following are interventions that have been identified:   Patient OVERDUE refilling LISINOPRIL TAB 5MG and active on home medication list.     Attempting to reach patient to review.  Left message asking for return call. Letter sent to patient.    Recent Visits  Date Type Provider Dept   25 Office Visit Paz Armas APRN - NP [facility]   25 Office Visit Paz Armas APRN - NP [facility]   10/02/24 Office Visit Paz Armas APRN - NP [facility]   24 Office Visit Paz Armas APRN - NP [facility]   24 Office Visit Paz Armas APRN - NP [facility]   24 Office Visit Paz Armas APRN - NP [facility]   02/15/24 Office Visit Paz Armas APRN - NP [facility]   Showing recent visits within past 540 days with a meds

## 2025-07-24 ENCOUNTER — OFFICE VISIT (OUTPATIENT)
Dept: FAMILY MEDICINE CLINIC | Age: 70
End: 2025-07-24

## 2025-07-24 VITALS
DIASTOLIC BLOOD PRESSURE: 81 MMHG | WEIGHT: 133.4 LBS | SYSTOLIC BLOOD PRESSURE: 127 MMHG | HEART RATE: 70 BPM | BODY MASS INDEX: 23.63 KG/M2 | TEMPERATURE: 97.3 F | OXYGEN SATURATION: 97 %

## 2025-07-24 DIAGNOSIS — G62.9 NEUROPATHY: ICD-10-CM

## 2025-07-24 DIAGNOSIS — M51.26 LUMBAR DISC HERNIATION: Primary | ICD-10-CM

## 2025-07-24 RX ORDER — GABAPENTIN 100 MG/1
100 CAPSULE ORAL NIGHTLY
Qty: 30 CAPSULE | Refills: 0 | Status: SHIPPED | OUTPATIENT
Start: 2025-07-24 | End: 2025-08-23

## 2025-07-24 ASSESSMENT — PATIENT HEALTH QUESTIONNAIRE - PHQ9
4. FEELING TIRED OR HAVING LITTLE ENERGY: NOT AT ALL
1. LITTLE INTEREST OR PLEASURE IN DOING THINGS: NOT AT ALL
SUM OF ALL RESPONSES TO PHQ QUESTIONS 1-9: 0
3. TROUBLE FALLING OR STAYING ASLEEP: NOT AT ALL
2. FEELING DOWN, DEPRESSED OR HOPELESS: NOT AT ALL
5. POOR APPETITE OR OVEREATING: NOT AT ALL
8. MOVING OR SPEAKING SO SLOWLY THAT OTHER PEOPLE COULD HAVE NOTICED. OR THE OPPOSITE, BEING SO FIGETY OR RESTLESS THAT YOU HAVE BEEN MOVING AROUND A LOT MORE THAN USUAL: NOT AT ALL
9. THOUGHTS THAT YOU WOULD BE BETTER OFF DEAD, OR OF HURTING YOURSELF: NOT AT ALL
10. IF YOU CHECKED OFF ANY PROBLEMS, HOW DIFFICULT HAVE THESE PROBLEMS MADE IT FOR YOU TO DO YOUR WORK, TAKE CARE OF THINGS AT HOME, OR GET ALONG WITH OTHER PEOPLE: NOT DIFFICULT AT ALL
SUM OF ALL RESPONSES TO PHQ QUESTIONS 1-9: 0
SUM OF ALL RESPONSES TO PHQ QUESTIONS 1-9: 0
7. TROUBLE CONCENTRATING ON THINGS, SUCH AS READING THE NEWSPAPER OR WATCHING TELEVISION: NOT AT ALL
6. FEELING BAD ABOUT YOURSELF - OR THAT YOU ARE A FAILURE OR HAVE LET YOURSELF OR YOUR FAMILY DOWN: NOT AT ALL
SUM OF ALL RESPONSES TO PHQ QUESTIONS 1-9: 0

## 2025-07-24 NOTE — PROGRESS NOTES
herniation  [place]AR[place] reviewed  Suggested OTC vitamin B12  Safety considerations  - gabapentin (NEURONTIN) 100 MG capsule; Take 1 capsule by mouth nightly for 30 days. Intended supply: 90 days  Dispense: 30 capsule; Refill: 0  - Eduardo Whitt MD, Neurosurgery (Spine), [facility][1]    2. Neuropathy  - gabapentin (NEURONTIN) 100 MG capsule; Take 1 capsule by mouth nightly for 30 days. Intended supply: 90 days  Dispense: 30 capsule; Refill: 0  - Eduardo Whitt MD, Neurosurgery (Spine), [facility][1]    Time Spent on discharge is more than 30 minutes in the examination, evaluation, counseling and review of medications and discharge plan.    Return in about 1 month (around 8/24/2025) for medication re-check.    This dictation was generated by voice recognition computer software.  Although all attempts are made to edit the dictation for accuracy, there may be errors in the transcription that are not intended.

## 2025-08-26 ENCOUNTER — OFFICE VISIT (OUTPATIENT)
Dept: FAMILY MEDICINE CLINIC | Age: 70
End: 2025-08-26

## 2025-08-26 VITALS
OXYGEN SATURATION: 97 % | DIASTOLIC BLOOD PRESSURE: 83 MMHG | TEMPERATURE: 97.2 F | WEIGHT: 134 LBS | BODY MASS INDEX: 23.74 KG/M2 | HEIGHT: 63 IN | SYSTOLIC BLOOD PRESSURE: 137 MMHG | HEART RATE: 65 BPM

## 2025-08-26 DIAGNOSIS — M51.26 LUMBAR DISC HERNIATION: Primary | ICD-10-CM

## (undated) DEVICE — ARM DRAPE

## (undated) DEVICE — BLADELESS OBTURATOR: Brand: WECK VISTA

## (undated) DEVICE — CATHETER TRAY 16 FR 5 CC FOL ANTIREFLX SAMPLING PRT DOVER

## (undated) DEVICE — COLUMN DRAPE

## (undated) DEVICE — SYRINGE, LUER LOCK, 10ML: Brand: MEDLINE

## (undated) DEVICE — AIRSEAL 8 MM ACCESS PORT AND LOW PROFILE OBTURATOR WITH BLADELESS OPTICAL TIP, 120 MM LENGTH: Brand: AIRSEAL

## (undated) DEVICE — INSUFFLATION NEEDLE TO ESTABLISH PNEUMOPERITONEUM.: Brand: INSUFFLATION NEEDLE

## (undated) DEVICE — ELECTRODE PT RET AD L9FT HI MOIST COND ADH HYDRGEL CORDED

## (undated) DEVICE — SUTURE PROL SZ 0 L30IN NONABSORBABLE BLU MO-6 L26MM 1/2 CIR 8418H

## (undated) DEVICE — SCRUB SURG BDINE FREE 4 OZ TECHNICARE

## (undated) DEVICE — CHLORAPREP 26ML ORANGE

## (undated) DEVICE — DEVICE SECUREMENT AD W/ TRICOT ANCHR PD FOR F LTX SIL CATH

## (undated) DEVICE — GAUZE,SPONGE,4"X4",8PLY,STRL,LF,10/TRAY: Brand: MEDLINE

## (undated) DEVICE — PAD,ABDOMINAL,8"X10",ST,LF: Brand: MEDLINE

## (undated) DEVICE — CANNULA SEAL

## (undated) DEVICE — INVIEW CLEAR LEGGINGS: Brand: CONVERTORS

## (undated) DEVICE — TRAY CATH CURITY ULTMR 16FR 2L FOLEY BAG

## (undated) DEVICE — PROTECTOR EYE PT SELF ADH NS OPT GRD LF

## (undated) DEVICE — 35 ML SYRINGE LUER-LOCK TIP: Brand: MONOJECT

## (undated) DEVICE — ROBOTIC PK

## (undated) DEVICE — LIGHT HANDLE: Brand: DEVON

## (undated) DEVICE — SOLUTION IV 1000ML LAC RINGERS PH 6.5 INJ USP VIAFLX PLAS

## (undated) DEVICE — CONTROL SYRINGE LUER-LOCK TIP: Brand: MONOJECT

## (undated) DEVICE — TIP COVER ACCESSORY

## (undated) DEVICE — NEEDLE HYPO 22GA L1.5IN BLK POLYPR HUB S STL REG BVL STR

## (undated) DEVICE — SUTURE ETHBND EXCEL D SPEC NO 1 48IN BRAID WHT D8558

## (undated) DEVICE — CYSTO/BLADDER IRRIGATION SET, REGULATING CLAMP

## (undated) DEVICE — SUTURE GOR TX SZ 0 L36IN NONABSORBABLE L24MM PH-24 1/2 CIR 2N10A

## (undated) DEVICE — SOLUTION ANTIFOG VIS SYS CLEARIFY LAPSCP

## (undated) DEVICE — SUTURE VCRL SZ 0 L27IN ABSRB UD L26MM CT-2 1/2 CIR J270H

## (undated) DEVICE — SUTURE MCRYL SZ 4-0 L27IN ABSRB UD L19MM PS-2 1/2 CIR PRIM Y426H

## (undated) DEVICE — SPONGE,LAP,4"X18",XR,ST,5/PK,40PK/CS: Brand: MEDLINE INDUSTRIES, INC.

## (undated) DEVICE — SUTURE VCRL SZ 3-0 L27IN ABSRB UD L26MM SH 1/2 CIR J416H

## (undated) DEVICE — SMALL GRASPING RETRACTOR: Brand: ENDOWRIST

## (undated) DEVICE — DRAPE SURG L39XW46IN PERI OB

## (undated) DEVICE — SUTURE DEV SZ 2-0 WND CLSR ABSRB GS-22 VLOC COVIDIEN VLOCM2145

## (undated) DEVICE — VAG HYSTERECTOMY-LF: Brand: MEDLINE INDUSTRIES, INC.

## (undated) DEVICE — COVER LT HNDL BLU PLAS

## (undated) DEVICE — SUTURE PDS II SZ 0 L27IN ABSRB VLT L26MM CT-2 1/2 CIR Z334H

## (undated) DEVICE — STERILE LATEX POWDER-FREE SURGICAL GLOVESWITH NITRILE COATING: Brand: PROTEXIS

## (undated) DEVICE — TRI-LUMEN FILTERED TUBE SET WITH ACTIVATED CHARCOAL FILTER: Brand: AIRSEAL

## (undated) DEVICE — STANDARD HYPODERMIC NEEDLE,POLYPROPYLENE HUB: Brand: MONOJECT

## (undated) DEVICE — MERCY FAIRFIELD TURNOVER KIT: Brand: MEDLINE INDUSTRIES, INC.

## (undated) DEVICE — Z DUP USE 2257490 ADHESIVE SKIN CLSRE 036ML TPCL 2CTL CNCRLTE HIGH VSCSTY DRMB

## (undated) DEVICE — TIP APPL L35CM RIG FOR SEAL EVICEL

## (undated) DEVICE — GAUZE,PACKING STRIP,IODOFORM,1"X5YD,STRL: Brand: CURAD

## (undated) DEVICE — COVER EQUIP STEEP TREND EZ CLN UP WTRPRF DISP FOR STD SZ

## (undated) DEVICE — TRAY PREP DRY W/ PREM GLV 2 APPL 6 SPNG 2 UNDPD 1 OVERWRAP

## (undated) DEVICE — SUTURE VCRL SZ 0 L36IN ABSRB UD L36MM CT-1 1/2 CIR J946H

## (undated) DEVICE — SOLUTION IV IRRIG POUR BRL 0.9% SODIUM CHL 2F7124

## (undated) DEVICE — SUTURE V-LOC 180 SZ 2-0 L9IN ABSRB GRN L27MM GS-22 1/2 CIR VLOCL2145

## (undated) DEVICE — MANIPULATOR UTER 3CM ULTEM PLAS CUP DELINEATOR FOR USE W/